# Patient Record
Sex: FEMALE | Race: WHITE | Employment: PART TIME | URBAN - METROPOLITAN AREA
[De-identification: names, ages, dates, MRNs, and addresses within clinical notes are randomized per-mention and may not be internally consistent; named-entity substitution may affect disease eponyms.]

---

## 2020-06-15 ENCOUNTER — APPOINTMENT (OUTPATIENT)
Dept: CT IMAGING | Age: 25
End: 2020-06-15
Payer: COMMERCIAL

## 2020-06-15 ENCOUNTER — HOSPITAL ENCOUNTER (EMERGENCY)
Age: 25
Discharge: HOME | End: 2020-06-15
Attending: EMERGENCY MEDICINE
Payer: COMMERCIAL

## 2020-06-15 VITALS
DIASTOLIC BLOOD PRESSURE: 102 MMHG | OXYGEN SATURATION: 97 % | SYSTOLIC BLOOD PRESSURE: 138 MMHG | TEMPERATURE: 97.9 F | WEIGHT: 230 LBS | HEART RATE: 91 BPM | RESPIRATION RATE: 13 BRPM | HEIGHT: 67 IN | BODY MASS INDEX: 36.1 KG/M2

## 2020-06-15 LAB
ALBUMIN SERPL-MCNC: 4.4 GM/DL (ref 3.4–5)
ALP BLD-CCNC: 75 IU/L (ref 40–129)
ALT SERPL-CCNC: 16 U/L (ref 10–40)
AMPHETAMINES: NEGATIVE
ANION GAP SERPL CALCULATED.3IONS-SCNC: 12 MMOL/L (ref 4–16)
AST SERPL-CCNC: 18 IU/L (ref 15–37)
BACTERIA: ABNORMAL /HPF
BARBITURATE SCREEN URINE: NEGATIVE
BASOPHILS ABSOLUTE: 0 K/CU MM
BASOPHILS RELATIVE PERCENT: 0.3 % (ref 0–1)
BENZODIAZEPINE SCREEN, URINE: ABNORMAL
BILIRUB SERPL-MCNC: 0.5 MG/DL (ref 0–1)
BILIRUBIN URINE: NEGATIVE MG/DL
BLOOD, URINE: NEGATIVE
BUN BLDV-MCNC: 8 MG/DL (ref 6–23)
CALCIUM SERPL-MCNC: 9.6 MG/DL (ref 8.3–10.6)
CANNABINOID SCREEN URINE: ABNORMAL
CAST TYPE: ABNORMAL /HPF
CHLORIDE BLD-SCNC: 104 MMOL/L (ref 99–110)
CLARITY: ABNORMAL
CO2: 21 MMOL/L (ref 21–32)
COCAINE METABOLITE: NEGATIVE
COLOR: ABNORMAL
COMMENT UA: NEGATIVE
CREAT SERPL-MCNC: 0.7 MG/DL (ref 0.6–1.1)
CRYSTAL TYPE: ABNORMAL /HPF
DIFFERENTIAL TYPE: ABNORMAL
EOSINOPHILS ABSOLUTE: 0.2 K/CU MM
EOSINOPHILS RELATIVE PERCENT: 2.3 % (ref 0–3)
EPITHELIAL CELLS, UA: 20 /HPF
GFR AFRICAN AMERICAN: >60 ML/MIN/1.73M2
GFR NON-AFRICAN AMERICAN: >60 ML/MIN/1.73M2
GLUCOSE BLD-MCNC: 113 MG/DL (ref 70–99)
GLUCOSE, URINE: NEGATIVE MG/DL
HCG QUALITATIVE: NEGATIVE
HCT VFR BLD CALC: 43.9 % (ref 37–47)
HEMOGLOBIN: 15 GM/DL (ref 12.5–16)
IMMATURE NEUTROPHIL %: 0.2 % (ref 0–0.43)
KETONES, URINE: NEGATIVE MG/DL
LEUKOCYTE ESTERASE, URINE: NEGATIVE
LIPASE: 21 IU/L (ref 13–60)
LYMPHOCYTES ABSOLUTE: 1.3 K/CU MM
LYMPHOCYTES RELATIVE PERCENT: 20.1 % (ref 24–44)
MCH RBC QN AUTO: 30.1 PG (ref 27–31)
MCHC RBC AUTO-ENTMCNC: 34.2 % (ref 32–36)
MCV RBC AUTO: 88.2 FL (ref 78–100)
MONOCYTES ABSOLUTE: 0.5 K/CU MM
MONOCYTES RELATIVE PERCENT: 6.9 % (ref 0–4)
NITRITE URINE, QUANTITATIVE: NEGATIVE
OPIATES, URINE: NEGATIVE
OXYCODONE: NEGATIVE
PDW BLD-RTO: 11.9 % (ref 11.7–14.9)
PH, URINE: 7.5 (ref 5–8)
PHENCYCLIDINE, URINE: NEGATIVE
PLATELET # BLD: 313 K/CU MM (ref 140–440)
PMV BLD AUTO: 10.2 FL (ref 7.5–11.1)
POTASSIUM SERPL-SCNC: 4 MMOL/L (ref 3.5–5.1)
PROTEIN UA: NEGATIVE MG/DL
RBC # BLD: 4.98 M/CU MM (ref 4.2–5.4)
RBC URINE: 1 /HPF (ref 0–6)
SEGMENTED NEUTROPHILS ABSOLUTE COUNT: 4.6 K/CU MM
SEGMENTED NEUTROPHILS RELATIVE PERCENT: 70.2 % (ref 36–66)
SODIUM BLD-SCNC: 137 MMOL/L (ref 135–145)
SPECIFIC GRAVITY UA: 1.01 (ref 1–1.03)
TOTAL IMMATURE NEUTOROPHIL: 0.01 K/CU MM
TOTAL PROTEIN: 7.6 GM/DL (ref 6.4–8.2)
UROBILINOGEN, URINE: 0.2 MG/DL (ref 0.2–1)
WBC # BLD: 6.6 K/CU MM (ref 4–10.5)
WBC UA: 3 /HPF (ref 0–5)

## 2020-06-15 PROCEDURE — 96376 TX/PRO/DX INJ SAME DRUG ADON: CPT

## 2020-06-15 PROCEDURE — 84703 CHORIONIC GONADOTROPIN ASSAY: CPT

## 2020-06-15 PROCEDURE — 6360000002 HC RX W HCPCS: Performed by: PHYSICIAN ASSISTANT

## 2020-06-15 PROCEDURE — 6360000004 HC RX CONTRAST MEDICATION: Performed by: EMERGENCY MEDICINE

## 2020-06-15 PROCEDURE — 96365 THER/PROPH/DIAG IV INF INIT: CPT

## 2020-06-15 PROCEDURE — 96375 TX/PRO/DX INJ NEW DRUG ADDON: CPT

## 2020-06-15 PROCEDURE — 6360000002 HC RX W HCPCS: Performed by: EMERGENCY MEDICINE

## 2020-06-15 PROCEDURE — 96372 THER/PROPH/DIAG INJ SC/IM: CPT

## 2020-06-15 PROCEDURE — 83690 ASSAY OF LIPASE: CPT

## 2020-06-15 PROCEDURE — 2580000003 HC RX 258: Performed by: EMERGENCY MEDICINE

## 2020-06-15 PROCEDURE — 80053 COMPREHEN METABOLIC PANEL: CPT

## 2020-06-15 PROCEDURE — 81001 URINALYSIS AUTO W/SCOPE: CPT

## 2020-06-15 PROCEDURE — 74177 CT ABD & PELVIS W/CONTRAST: CPT

## 2020-06-15 PROCEDURE — 85025 COMPLETE CBC W/AUTO DIFF WBC: CPT

## 2020-06-15 PROCEDURE — 6370000000 HC RX 637 (ALT 250 FOR IP): Performed by: EMERGENCY MEDICINE

## 2020-06-15 PROCEDURE — 71275 CT ANGIOGRAPHY CHEST: CPT

## 2020-06-15 PROCEDURE — 80307 DRUG TEST PRSMV CHEM ANLYZR: CPT

## 2020-06-15 PROCEDURE — 99284 EMERGENCY DEPT VISIT MOD MDM: CPT

## 2020-06-15 RX ORDER — DICYCLOMINE HYDROCHLORIDE 10 MG/ML
20 INJECTION INTRAMUSCULAR ONCE
Status: COMPLETED | OUTPATIENT
Start: 2020-06-15 | End: 2020-06-15

## 2020-06-15 RX ORDER — KETOROLAC TROMETHAMINE 30 MG/ML
15 INJECTION, SOLUTION INTRAMUSCULAR; INTRAVENOUS ONCE
Status: COMPLETED | OUTPATIENT
Start: 2020-06-15 | End: 2020-06-15

## 2020-06-15 RX ORDER — ONDANSETRON 4 MG/1
4 TABLET, ORALLY DISINTEGRATING ORAL ONCE
Status: DISCONTINUED | OUTPATIENT
Start: 2020-06-15 | End: 2020-06-15

## 2020-06-15 RX ORDER — DIPHENHYDRAMINE HYDROCHLORIDE 50 MG/ML
25 INJECTION INTRAMUSCULAR; INTRAVENOUS ONCE
Status: COMPLETED | OUTPATIENT
Start: 2020-06-15 | End: 2020-06-15

## 2020-06-15 RX ORDER — HYDROCODONE BITARTRATE AND ACETAMINOPHEN 5; 325 MG/1; MG/1
1 TABLET ORAL EVERY 6 HOURS PRN
Qty: 12 TABLET | Refills: 0 | Status: SHIPPED | OUTPATIENT
Start: 2020-06-15 | End: 2020-06-18

## 2020-06-15 RX ORDER — MORPHINE SULFATE 4 MG/ML
4 INJECTION, SOLUTION INTRAMUSCULAR; INTRAVENOUS ONCE
Status: COMPLETED | OUTPATIENT
Start: 2020-06-15 | End: 2020-06-15

## 2020-06-15 RX ORDER — NAPROXEN 500 MG/1
500 TABLET ORAL 2 TIMES DAILY WITH MEALS
Qty: 180 TABLET | Refills: 1 | Status: SHIPPED | OUTPATIENT
Start: 2020-06-15

## 2020-06-15 RX ORDER — ONDANSETRON 2 MG/ML
4 INJECTION INTRAMUSCULAR; INTRAVENOUS
Status: DISCONTINUED | OUTPATIENT
Start: 2020-06-15 | End: 2020-06-15 | Stop reason: HOSPADM

## 2020-06-15 RX ORDER — DIPHENHYDRAMINE HYDROCHLORIDE 50 MG/ML
50 INJECTION INTRAMUSCULAR; INTRAVENOUS ONCE
Status: COMPLETED | OUTPATIENT
Start: 2020-06-15 | End: 2020-06-15

## 2020-06-15 RX ORDER — LORAZEPAM 1 MG/1
1 TABLET ORAL EVERY 6 HOURS PRN
Qty: 10 TABLET | Refills: 0 | Status: SHIPPED | OUTPATIENT
Start: 2020-06-15 | End: 2020-07-15

## 2020-06-15 RX ORDER — ONDANSETRON 2 MG/ML
4 INJECTION INTRAMUSCULAR; INTRAVENOUS EVERY 30 MIN PRN
Status: DISCONTINUED | OUTPATIENT
Start: 2020-06-15 | End: 2020-06-15 | Stop reason: HOSPADM

## 2020-06-15 RX ORDER — MORPHINE SULFATE 4 MG/ML
4 INJECTION, SOLUTION INTRAMUSCULAR; INTRAVENOUS EVERY 4 HOURS PRN
Status: DISCONTINUED | OUTPATIENT
Start: 2020-06-15 | End: 2020-06-15

## 2020-06-15 RX ORDER — METHYLPREDNISOLONE SODIUM SUCCINATE 125 MG/2ML
125 INJECTION, POWDER, LYOPHILIZED, FOR SOLUTION INTRAMUSCULAR; INTRAVENOUS ONCE
Status: COMPLETED | OUTPATIENT
Start: 2020-06-15 | End: 2020-06-15

## 2020-06-15 RX ORDER — SODIUM CHLORIDE, SODIUM LACTATE, POTASSIUM CHLORIDE, CALCIUM CHLORIDE 600; 310; 30; 20 MG/100ML; MG/100ML; MG/100ML; MG/100ML
1000 INJECTION, SOLUTION INTRAVENOUS ONCE
Status: COMPLETED | OUTPATIENT
Start: 2020-06-15 | End: 2020-06-15

## 2020-06-15 RX ORDER — ACETAMINOPHEN 325 MG/1
650 TABLET ORAL EVERY 6 HOURS PRN
Qty: 20 TABLET | Refills: 0 | Status: SHIPPED | OUTPATIENT
Start: 2020-06-15

## 2020-06-15 RX ORDER — HYDROCODONE BITARTRATE AND ACETAMINOPHEN 5; 325 MG/1; MG/1
1 TABLET ORAL EVERY 6 HOURS PRN
Status: DISCONTINUED | OUTPATIENT
Start: 2020-06-15 | End: 2020-06-15 | Stop reason: HOSPADM

## 2020-06-15 RX ADMIN — ONDANSETRON 4 MG: 2 INJECTION INTRAMUSCULAR; INTRAVENOUS at 18:32

## 2020-06-15 RX ADMIN — METHYLPREDNISOLONE SODIUM SUCCINATE 125 MG: 125 INJECTION, POWDER, FOR SOLUTION INTRAMUSCULAR; INTRAVENOUS at 18:32

## 2020-06-15 RX ADMIN — IOPAMIDOL 75 ML: 755 INJECTION, SOLUTION INTRAVENOUS at 19:15

## 2020-06-15 RX ADMIN — DICYCLOMINE HYDROCHLORIDE 20 MG: 20 INJECTION, SOLUTION INTRAMUSCULAR at 18:32

## 2020-06-15 RX ADMIN — HYDROCODONE BITARTRATE AND ACETAMINOPHEN 1 TABLET: 5; 325 TABLET ORAL at 21:21

## 2020-06-15 RX ADMIN — ONDANSETRON 4 MG: 2 INJECTION INTRAMUSCULAR; INTRAVENOUS at 19:26

## 2020-06-15 RX ADMIN — DIPHENHYDRAMINE HYDROCHLORIDE 25 MG: 50 INJECTION INTRAMUSCULAR; INTRAVENOUS at 20:13

## 2020-06-15 RX ADMIN — KETOROLAC TROMETHAMINE 15 MG: 30 INJECTION, SOLUTION INTRAMUSCULAR; INTRAVENOUS at 18:32

## 2020-06-15 RX ADMIN — MORPHINE SULFATE 4 MG: 4 INJECTION INTRAVENOUS at 19:25

## 2020-06-15 RX ADMIN — DIPHENHYDRAMINE HYDROCHLORIDE 50 MG: 50 INJECTION INTRAMUSCULAR; INTRAVENOUS at 18:32

## 2020-06-15 RX ADMIN — IOPAMIDOL 75 ML: 755 INJECTION, SOLUTION INTRAVENOUS at 20:44

## 2020-06-15 RX ADMIN — SODIUM CHLORIDE, POTASSIUM CHLORIDE, SODIUM LACTATE AND CALCIUM CHLORIDE 1000 ML: 600; 310; 30; 20 INJECTION, SOLUTION INTRAVENOUS at 20:13

## 2020-06-15 RX ADMIN — ONDANSETRON 4 MG: 2 INJECTION INTRAMUSCULAR; INTRAVENOUS at 20:13

## 2020-06-15 RX ADMIN — MORPHINE SULFATE 4 MG: 4 INJECTION INTRAVENOUS at 20:13

## 2020-06-15 ASSESSMENT — PAIN SCALES - GENERAL
PAINLEVEL_OUTOF10: 7

## 2020-06-15 ASSESSMENT — ENCOUNTER SYMPTOMS
RESPIRATORY NEGATIVE: 1
NAUSEA: 0
CONSTIPATION: 0
DIARRHEA: 0
SHORTNESS OF BREATH: 0
CHEST TIGHTNESS: 0
COUGH: 0
EYES NEGATIVE: 1
PHOTOPHOBIA: 0
ABDOMINAL PAIN: 1
APNEA: 0
SINUS PAIN: 0
VOICE CHANGE: 0
EYE ITCHING: 0
SINUS PRESSURE: 0
TROUBLE SWALLOWING: 0
STRIDOR: 0
BLOOD IN STOOL: 0
BACK PAIN: 0
FACIAL SWELLING: 0
RHINORRHEA: 0
RECTAL PAIN: 0
WHEEZING: 0
EYE REDNESS: 0
CHOKING: 0
EYE DISCHARGE: 0
VOMITING: 0
EYE PAIN: 0

## 2020-06-15 ASSESSMENT — PAIN DESCRIPTION - ORIENTATION: ORIENTATION: LOWER

## 2020-06-15 ASSESSMENT — PAIN DESCRIPTION - DESCRIPTORS: DESCRIPTORS: SHARP;CRAMPING

## 2020-06-15 ASSESSMENT — PAIN DESCRIPTION - LOCATION: LOCATION: ABDOMEN

## 2020-06-15 ASSESSMENT — PAIN DESCRIPTION - PAIN TYPE: TYPE: ACUTE PAIN

## 2020-06-15 NOTE — ED PROVIDER NOTES
[facility])     2010    Ovarian cyst      Past Surgical History:   Procedure Laterality Date    ANKLE SURGERY      rt     SECTION      CHOLECYSTECTOMY      SHOULDER SURGERY      left       CURRENT MEDICATIONS    Current Outpatient Rx   Medication Sig Dispense Refill    Cyclobenzaprine HCl (FLEXERIL PO) Take 0.5 mg by mouth         ALLERGIES    Allergies   Allergen Reactions    Effexor [Venlafaxine] Nausea And Vomiting    Iv Contrast [Iodides] Hives    Nsaids Nausea And Vomiting       SOCIAL AND FAMILY HISTORY    Social History     Socioeconomic History    Marital status: Single     Spouse name: None    Number of children: None    Years of education: None    Highest education level: None   Occupational History    None   Social Needs    Financial resource strain: None    Food insecurity     Worry: None     Inability: None    Transportation needs     Medical: None     Non-medical: None   Tobacco Use    Smoking status: Current Every Day Smoker     Packs/day: 0.50     Types: Cigarettes   Substance and Sexual Activity    Alcohol use: Yes     Comment: occ    Drug use: Not Currently    Sexual activity: None   Lifestyle    Physical activity     Days per week: None     Minutes per session: None    Stress: None   Relationships    Social connections     Talks on phone: None     Gets together: None     Attends Sikhism service: None     Active member of club or organization: None     Attends meetings of clubs or organizations: None     Relationship status: None    Intimate partner violence     Fear of current or ex partner: None     Emotionally abused: None     Physically abused: None     Forced sexual activity: None   Other Topics Concern    None   Social History Narrative    None     History reviewed. No pertinent family history.       PHYSICAL EXAM    VITAL SIGNS: BP (!) 138/102   Pulse 113   Temp 97.9 °F (36.6 °C) (Tympanic)   Resp 13   Ht 5' 7\" (1.702 m)   Wt 230 lb (104.3 kg) SpO2 97%   BMI 36.02 kg/m²    Constitutional:  Well developed, Well nourished, NAD  HENT:  Normocephalic, Atraumatic, PERRL. EOMI. Sclera clear. Conjunctiva normal, No discharge. Neck/Lymphatics: supple, no JVD, no swollen nodes  Cardiovascular:  Rate 113, regular rhythm,  no murmurs/rubs/gallops. No carotid bruits or murmurs heard in carotids. No JVD  Respiratory:  Nonlabored breathing. Normal breath sounds, No wheezing  Abdomen:   Right lower quadrant tenderness, McBurney point tenderness and mild tenderness in the left lower quadrant as well ,Bowel sounds normal, Soft,  no masses. Musculoskeletal:    There is no edema, asymmetry, or calf / thigh tenderness bilaterally. No cyanosis. No cool or pale-appearing limb. Distal cap refill and pulses intact bilateral upper and lower extremities  Bilateral upper and lower extremity ROM intact without pain or obvious deficit  Integument:  Warm, Dry  Neurologic: Alert & oriented , No focal deficits noted. Cranial nerves II through XII grossly intact. Normal gross motor coordination & motor strength bilateral upper and lower extremities  Sensation intact.   Psychiatric:  Affect normal, Mood normal.       Results for orders placed or performed during the hospital encounter of 06/15/20   Urinalysis   Result Value Ref Range    Color, UA DARK YELLOW (A) YELLOW    Clarity, UA SLIGHTLY CLOUDY (A) CLEAR    Glucose, Urine NEGATIVE NEGATIVE MG/DL    Bilirubin Urine NEGATIVE NEGATIVE MG/DL    Ketones, Urine NEGATIVE NEGATIVE MG/DL    Specific Gravity, UA 1.015 1.001 - 1.035    Blood, Urine NEGATIVE NEGATIVE    pH, Urine 7.5 5.0 - 8.0    Protein, UA NEGATIVE NEGATIVE MG/DL    Urobilinogen, Urine 0.2 0.2 - 1.0 MG/DL    Nitrite Urine, Quantitative NEGATIVE NEGATIVE    Leukocyte Esterase, Urine NEGATIVE NEGATIVE    RBC, UA 1 0 - 6 /HPF    WBC, UA 3 0 - 5 /HPF    Epithelial Cells, UA 20 /HPF    Cast Type NO CAST FORMS SEEN NO CAST FORMS SEEN /HPF    Bacteria, UA MODERATE

## 2020-06-16 ENCOUNTER — APPOINTMENT (OUTPATIENT)
Dept: CT IMAGING | Age: 25
End: 2020-06-16
Payer: COMMERCIAL

## 2020-06-16 ENCOUNTER — APPOINTMENT (OUTPATIENT)
Dept: ULTRASOUND IMAGING | Age: 25
End: 2020-06-16
Payer: COMMERCIAL

## 2020-06-16 ENCOUNTER — HOSPITAL ENCOUNTER (EMERGENCY)
Age: 25
Discharge: HOME | End: 2020-06-16
Attending: EMERGENCY MEDICINE
Payer: COMMERCIAL

## 2020-06-16 VITALS
OXYGEN SATURATION: 97 % | TEMPERATURE: 97.5 F | HEART RATE: 88 BPM | SYSTOLIC BLOOD PRESSURE: 126 MMHG | DIASTOLIC BLOOD PRESSURE: 88 MMHG | HEIGHT: 67 IN | WEIGHT: 214 LBS | RESPIRATION RATE: 14 BRPM | BODY MASS INDEX: 33.59 KG/M2

## 2020-06-16 PROBLEM — I26.99 PE (PULMONARY THROMBOEMBOLISM) (HCC): Status: ACTIVE | Noted: 2020-06-16

## 2020-06-16 PROBLEM — Z76.5 DRUG-SEEKING BEHAVIOR: Status: ACTIVE | Noted: 2020-06-16

## 2020-06-16 LAB
APTT: 38.8 SECONDS (ref 25.1–37.1)
D DIMER: 1.23 UG/ML (FEU)
FIBRINOGEN LEVEL: 418 MG/DL (ref 196.9–442.1)
HOMOCYSTEINE: 10.6 UMOL/L (ref 0–10)
INR BLD: 1.42 INDEX
PROTHROMBIN TIME: 17.2 SECONDS (ref 11.7–14.5)

## 2020-06-16 PROCEDURE — 71275 CT ANGIOGRAPHY CHEST: CPT

## 2020-06-16 PROCEDURE — 85613 RUSSELL VIPER VENOM DILUTED: CPT

## 2020-06-16 PROCEDURE — 96375 TX/PRO/DX INJ NEW DRUG ADDON: CPT

## 2020-06-16 PROCEDURE — 81241 F5 GENE: CPT

## 2020-06-16 PROCEDURE — 85384 FIBRINOGEN ACTIVITY: CPT

## 2020-06-16 PROCEDURE — 93970 EXTREMITY STUDY: CPT

## 2020-06-16 PROCEDURE — 96374 THER/PROPH/DIAG INJ IV PUSH: CPT

## 2020-06-16 PROCEDURE — 85240 CLOT FACTOR VIII AHG 1 STAGE: CPT

## 2020-06-16 PROCEDURE — 81291 MTHFR GENE: CPT

## 2020-06-16 PROCEDURE — 83090 ASSAY OF HOMOCYSTEINE: CPT

## 2020-06-16 PROCEDURE — 99283 EMERGENCY DEPT VISIT LOW MDM: CPT

## 2020-06-16 PROCEDURE — 2580000003 HC RX 258: Performed by: EMERGENCY MEDICINE

## 2020-06-16 PROCEDURE — 6360000002 HC RX W HCPCS: Performed by: EMERGENCY MEDICINE

## 2020-06-16 PROCEDURE — 85730 THROMBOPLASTIN TIME PARTIAL: CPT

## 2020-06-16 PROCEDURE — 96376 TX/PRO/DX INJ SAME DRUG ADON: CPT

## 2020-06-16 PROCEDURE — 6370000000 HC RX 637 (ALT 250 FOR IP): Performed by: EMERGENCY MEDICINE

## 2020-06-16 PROCEDURE — 81240 F2 GENE: CPT

## 2020-06-16 PROCEDURE — 6360000004 HC RX CONTRAST MEDICATION: Performed by: EMERGENCY MEDICINE

## 2020-06-16 PROCEDURE — 85379 FIBRIN DEGRADATION QUANT: CPT

## 2020-06-16 PROCEDURE — 85610 PROTHROMBIN TIME: CPT

## 2020-06-16 RX ORDER — SODIUM CHLORIDE 0.9 % (FLUSH) 0.9 %
10 SYRINGE (ML) INJECTION
Status: COMPLETED | OUTPATIENT
Start: 2020-06-16 | End: 2020-06-16

## 2020-06-16 RX ORDER — IBUPROFEN 400 MG/1
800 TABLET ORAL ONCE
Status: DISCONTINUED | OUTPATIENT
Start: 2020-06-16 | End: 2020-06-16 | Stop reason: HOSPADM

## 2020-06-16 RX ORDER — FENTANYL CITRATE 50 UG/ML
50 INJECTION, SOLUTION INTRAMUSCULAR; INTRAVENOUS
Status: DISCONTINUED | OUTPATIENT
Start: 2020-06-16 | End: 2020-06-16 | Stop reason: HOSPADM

## 2020-06-16 RX ORDER — HYDROCODONE BITARTRATE AND ACETAMINOPHEN 5; 325 MG/1; MG/1
1 TABLET ORAL EVERY 6 HOURS PRN
Status: DISCONTINUED | OUTPATIENT
Start: 2020-06-16 | End: 2020-06-16 | Stop reason: HOSPADM

## 2020-06-16 RX ORDER — DIPHENHYDRAMINE HYDROCHLORIDE 50 MG/ML
50 INJECTION INTRAMUSCULAR; INTRAVENOUS ONCE
Status: COMPLETED | OUTPATIENT
Start: 2020-06-16 | End: 2020-06-16

## 2020-06-16 RX ORDER — ONDANSETRON 2 MG/ML
4 INJECTION INTRAMUSCULAR; INTRAVENOUS
Status: DISCONTINUED | OUTPATIENT
Start: 2020-06-16 | End: 2020-06-16 | Stop reason: HOSPADM

## 2020-06-16 RX ORDER — METHYLPREDNISOLONE SODIUM SUCCINATE 125 MG/2ML
125 INJECTION, POWDER, LYOPHILIZED, FOR SOLUTION INTRAMUSCULAR; INTRAVENOUS ONCE
Status: COMPLETED | OUTPATIENT
Start: 2020-06-16 | End: 2020-06-16

## 2020-06-16 RX ADMIN — IOPAMIDOL 90 ML: 755 INJECTION, SOLUTION INTRAVENOUS at 18:57

## 2020-06-16 RX ADMIN — ONDANSETRON 4 MG: 2 INJECTION INTRAMUSCULAR; INTRAVENOUS at 17:53

## 2020-06-16 RX ADMIN — FENTANYL CITRATE 50 MCG: 50 INJECTION INTRAMUSCULAR; INTRAVENOUS at 16:22

## 2020-06-16 RX ADMIN — HYDROCODONE BITARTRATE AND ACETAMINOPHEN 1 TABLET: 5; 325 TABLET ORAL at 19:59

## 2020-06-16 RX ADMIN — FENTANYL CITRATE 50 MCG: 50 INJECTION INTRAMUSCULAR; INTRAVENOUS at 17:53

## 2020-06-16 RX ADMIN — DIPHENHYDRAMINE HYDROCHLORIDE 50 MG: 50 INJECTION INTRAMUSCULAR; INTRAVENOUS at 18:29

## 2020-06-16 RX ADMIN — SODIUM CHLORIDE, PRESERVATIVE FREE 10 ML: 5 INJECTION INTRAVENOUS at 18:58

## 2020-06-16 RX ADMIN — METHYLPREDNISOLONE SODIUM SUCCINATE 125 MG: 125 INJECTION, POWDER, FOR SOLUTION INTRAMUSCULAR; INTRAVENOUS at 18:29

## 2020-06-16 ASSESSMENT — PAIN SCALES - GENERAL
PAINLEVEL_OUTOF10: 7
PAINLEVEL_OUTOF10: 7
PAINLEVEL_OUTOF10: 8
PAINLEVEL_OUTOF10: 8

## 2020-06-16 ASSESSMENT — PAIN DESCRIPTION - PAIN TYPE: TYPE: ACUTE PAIN

## 2020-06-16 ASSESSMENT — PAIN DESCRIPTION - LOCATION: LOCATION: ABDOMEN

## 2020-06-16 NOTE — ED NOTES
Pt called out states her pain is returning and nauseated  Dr Suzan Josue notified no new orders at this time      Pricilla Mckenzie RN  06/16/20 9460

## 2020-06-16 NOTE — ED PROVIDER NOTES
[iodides]; and Nsaids    FAMILY HISTORY     History reviewed. No pertinent family history. SOCIAL HISTORY       Social History     Socioeconomic History    Marital status: Single     Spouse name: None    Number of children: None    Years of education: None    Highest education level: None   Occupational History    None   Social Needs    Financial resource strain: None    Food insecurity     Worry: None     Inability: None    Transportation needs     Medical: None     Non-medical: None   Tobacco Use    Smoking status: Current Every Day Smoker     Packs/day: 0.50     Types: Cigarettes   Substance and Sexual Activity    Alcohol use: Yes     Comment: occ    Drug use: Not Currently    Sexual activity: None   Lifestyle    Physical activity     Days per week: None     Minutes per session: None    Stress: None   Relationships    Social connections     Talks on phone: None     Gets together: None     Attends Holiness service: None     Active member of club or organization: None     Attends meetings of clubs or organizations: None     Relationship status: None    Intimate partner violence     Fear of current or ex partner: None     Emotionally abused: None     Physically abused: None     Forced sexual activity: None   Other Topics Concern    None   Social History Narrative    None       SCREENINGS               PHYSICAL EXAM    (up to 7 for level 4, 8 or more for level 5)     ED Triage Vitals [06/15/20 1756]   BP Temp Temp Source Pulse Resp SpO2 Height Weight   (!) 138/102 97.9 °F (36.6 °C) Tympanic 113 13 97 % 5' 7\" (1.702 m) 230 lb (104.3 kg)       Physical Exam  Vitals signs and nursing note reviewed. Constitutional:       General: She is not in acute distress. Appearance: She is well-developed. She is not diaphoretic. HENT:      Head: Normocephalic and atraumatic.       Right Ear: External ear normal.      Left Ear: External ear normal.      Nose: Nose normal.      Mouth/Throat: Abnormal; Notable for the following components:    Glucose 113 (*)     All other components within normal limits   URINALYSIS - Abnormal; Notable for the following components:    Color, UA DARK YELLOW (*)     Clarity, UA SLIGHTLY CLOUDY (*)     Bacteria, UA MODERATE NUMBER OR AMOUNT OBSERVED (*)     Crystal Type NO CELLS SEEN (*)     All other components within normal limits   URINE DRUG SCREEN - Abnormal; Notable for the following components:    Cannabinoid Scrn, Ur UNCONFIRMED POSITIVE (*)     Benzodiazepine Screen, Urine UNCONFIRMED POSITIVE (*)     All other components within normal limits   LIPASE   HCG, SERUM, QUALITATIVE          EKG: All EKG's are interpreted by the Emergency Department Physician who either signs or Co-signs this chart in the absence of a cardiologist.       EKG Interpretation    Interpreted by emergency department physician    Rhythm: normal sinus   Rate: normal  Axis: normal  Ectopy: none  Conduction: normal  ST Segments: no acute change  T Waves: no acute change  Q Waves: none    Clinical Impression: no acute changes    Keisha Bowie     RADIOLOGY:     Non-plain film images such as CT, Ultrasound and MRI are read by the radiologist. Plain radiographic images are visualized and preliminarily interpreted by the emergency physician. Interpretation per the Radiologist below, if available at the time of this note:    CTA PULMONARY W CONTRAST   Final Result   Addendum 1 of 1   ADDENDUM:   Findings were discussed with Tl Kent at 9:08 pm on 6/15/2020. Final      CT ABDOMEN PELVIS W IV CONTRAST Additional Contrast? None   Final Result   Addendum 1 of 1   ADDENDUM:   Findings were discussed with Dr. Ivory Lazar at 7:59 pm on 6/15/2020.          Final            ED BEDSIDE ULTRASOUND:   Performed by ED Physician Keisha Bowie DO       LABS:  Labs Reviewed   CBC WITH AUTO DIFFERENTIAL - Abnormal; Notable for the following components:       Result Value    Segs Relative 70.2 (*) PM      PATIENT REFERRED TO:  PCM in 3 days          Veronica Calzada MD  [address]  [address] [address]  [phone]    In 2 days        DISCHARGE MEDICATIONS:  New Prescriptions    No medications on file       ED Provider Disposition Time  DISPOSITION Decision To Discharge 06/15/2020 09:20:54 PM      Appropriate personal protective equipment was worn during the patient's evaluation. These included surgical, eye protection, surgical mask or in 95 respirator and gloves. The patient was also placed in a surgical mask for the prevention of possible spread of respiratory viral illnesses. The Patient was instructed to read the package inserts with any medication that was prescribed. Major potential reactions and medication interactions were discussed. The Patient understands that there are numerous possible adverse reactions not covered. The patient was also instructed to arrange follow-up with his or her primary care provider for review of any pending labwork or incidental findings on any radiology results that were obtained. All efforts were made to discuss any incidental findings that require further monitoring. Controlled Substances Monitoring:     No flowsheet data found.     (Please note that portions of this note were completed with a voice recognition program.  Efforts were made to edit the dictations but occasionally words are mis-transcribed.)    Liborio Hendrickson DO (electronically signed)  Attending Emergency Physician            Liborio Hendrickson DO  06/15/20 3877

## 2020-06-19 LAB
DILUTE RUSSELL VIPER VENOM TIME: 57 SEC (ref 33–44)
DRVVT 1 TO 1 MIX REFLEX ONLY: 51 SEC (ref 33–44)
DRVVT CONFIRMATION TEST: NEGATIVE RATIO
FACTOR V LEIDEN: NEGATIVE
FACTOR VIII ACTIVITY: 115 % (ref 56–191)

## 2020-06-23 LAB — PROTHROMBIN G20210A MUTATION: NORMAL

## 2020-06-24 LAB
MTHFR BY PCR SPECIMEN: NORMAL
MTHFR INTERPRETATION: NORMAL
MTHFR MUTATION A1298C: NORMAL
MTHFR MUTATION C677T: NEGATIVE

## 2023-03-06 ENCOUNTER — TELEPHONE (OUTPATIENT)
Dept: PRIMARY CARE | Facility: CLINIC | Age: 28
End: 2023-03-06
Payer: COMMERCIAL

## 2023-03-07 PROBLEM — M47.812 CERVICAL SPONDYLOSIS WITHOUT MYELOPATHY: Status: ACTIVE | Noted: 2023-03-07

## 2023-03-07 PROBLEM — N64.4 BREAST PAIN IN FEMALE: Status: ACTIVE | Noted: 2023-03-07

## 2023-03-07 PROBLEM — R10.2 PELVIC PAIN IN FEMALE: Status: ACTIVE | Noted: 2023-03-07

## 2023-03-07 PROBLEM — S82.51XA FRACTURE OF MEDIAL MALLEOLUS, RIGHT, CLOSED: Status: ACTIVE | Noted: 2023-03-07

## 2023-03-07 PROBLEM — L72.9 SUBCUTANEOUS CYST: Status: ACTIVE | Noted: 2023-03-07

## 2023-03-07 PROBLEM — G47.11 IDIOPATHIC HYPERSOMNIA: Status: ACTIVE | Noted: 2023-03-07

## 2023-03-07 PROBLEM — N92.6 IRREGULAR MENSES: Status: ACTIVE | Noted: 2023-03-07

## 2023-03-07 PROBLEM — L02.91 ABSCESS: Status: ACTIVE | Noted: 2023-03-07

## 2023-03-07 PROBLEM — N64.4 PAIN OF RIGHT BREAST: Status: ACTIVE | Noted: 2023-03-07

## 2023-03-07 PROBLEM — U07.1 COVID-19: Status: ACTIVE | Noted: 2023-03-07

## 2023-03-07 PROBLEM — I80.8 SUPERFICIAL THROMBOPHLEBITIS OF ARM: Status: ACTIVE | Noted: 2023-03-07

## 2023-03-07 PROBLEM — R11.2 NAUSEA AND/OR VOMITING: Status: ACTIVE | Noted: 2023-03-07

## 2023-03-07 PROBLEM — G47.00 INSOMNIA: Status: ACTIVE | Noted: 2023-03-07

## 2023-03-07 PROBLEM — M54.2 NECK PAIN: Status: ACTIVE | Noted: 2023-03-07

## 2023-03-07 PROBLEM — R45.86 MOOD CHANGE: Status: ACTIVE | Noted: 2023-03-07

## 2023-03-07 PROBLEM — I63.9 CVA (CEREBRAL VASCULAR ACCIDENT) (MULTI): Status: ACTIVE | Noted: 2023-03-07

## 2023-03-07 PROBLEM — G89.29 CHRONIC PAIN: Status: ACTIVE | Noted: 2023-03-07

## 2023-03-07 PROBLEM — I63.9 STROKE (MULTI): Status: ACTIVE | Noted: 2023-03-07

## 2023-03-07 PROBLEM — M54.50 LOW BACK PAIN: Status: ACTIVE | Noted: 2023-03-07

## 2023-03-07 PROBLEM — M54.2 CERVICAL PAIN: Status: ACTIVE | Noted: 2023-03-07

## 2023-03-07 PROBLEM — B96.89 BV (BACTERIAL VAGINOSIS): Status: ACTIVE | Noted: 2023-03-07

## 2023-03-07 PROBLEM — M25.312 INSTABILITY OF BOTH SHOULDER JOINTS: Status: ACTIVE | Noted: 2023-03-07

## 2023-03-07 PROBLEM — M25.511 BILATERAL SHOULDER PAIN: Status: ACTIVE | Noted: 2023-03-07

## 2023-03-07 PROBLEM — S82.891A OTHER FRACTURE OF RIGHT LOWER LEG, INITIAL ENCOUNTER FOR CLOSED FRACTURE: Status: ACTIVE | Noted: 2023-03-07

## 2023-03-07 PROBLEM — Q44.5 CYSTIC DILATATION OF COMMON BILE DUCT: Status: ACTIVE | Noted: 2023-03-07

## 2023-03-07 PROBLEM — M25.311 INSTABILITY OF BOTH SHOULDER JOINTS: Status: ACTIVE | Noted: 2023-03-07

## 2023-03-07 PROBLEM — R51.9 HEADACHE: Status: ACTIVE | Noted: 2023-03-07

## 2023-03-07 PROBLEM — R10.13 ABDOMINAL PAIN, EPIGASTRIC: Status: ACTIVE | Noted: 2023-03-07

## 2023-03-07 PROBLEM — K08.409 WISDOM TEETH REMOVED: Status: ACTIVE | Noted: 2023-03-07

## 2023-03-07 PROBLEM — R44.3 HALLUCINATION: Status: ACTIVE | Noted: 2023-03-07

## 2023-03-07 PROBLEM — G47.19 EXCESSIVE DAYTIME SLEEPINESS: Status: ACTIVE | Noted: 2023-03-07

## 2023-03-07 PROBLEM — N76.0 BV (BACTERIAL VAGINOSIS): Status: ACTIVE | Noted: 2023-03-07

## 2023-03-07 PROBLEM — K80.50 CHOLEDOCHOLITHIASIS: Status: ACTIVE | Noted: 2023-03-07

## 2023-03-07 PROBLEM — L29.9 PRURITUS: Status: ACTIVE | Noted: 2023-03-07

## 2023-03-07 PROBLEM — R76.8 ANTI-RNP ANTIBODIES PRESENT: Status: ACTIVE | Noted: 2023-03-07

## 2023-03-07 PROBLEM — K08.89 PAIN, DENTAL: Status: ACTIVE | Noted: 2023-03-07

## 2023-03-07 PROBLEM — M54.16 CHRONIC LUMBAR RADICULOPATHY: Status: ACTIVE | Noted: 2023-03-07

## 2023-03-07 PROBLEM — M79.18 MYOFASCIAL PAIN: Status: ACTIVE | Noted: 2023-03-07

## 2023-03-07 PROBLEM — R52 DIFFUSE PAIN: Status: ACTIVE | Noted: 2023-03-07

## 2023-03-07 PROBLEM — I26.99 PULMONARY EMBOLISM (MULTI): Status: ACTIVE | Noted: 2023-03-07

## 2023-03-07 PROBLEM — R41.840 ATTENTION OR CONCENTRATION DEFICIT: Status: ACTIVE | Noted: 2023-03-07

## 2023-03-07 PROBLEM — R07.89 CHEST PAIN, ATYPICAL: Status: ACTIVE | Noted: 2023-03-07

## 2023-03-07 PROBLEM — M50.20 CERVICAL DISC HERNIATION: Status: ACTIVE | Noted: 2023-03-07

## 2023-03-07 PROBLEM — M54.50 LUMBAR SPINE PAIN: Status: ACTIVE | Noted: 2023-03-07

## 2023-03-07 PROBLEM — G56.03 BILATERAL CARPAL TUNNEL SYNDROME: Status: ACTIVE | Noted: 2023-03-07

## 2023-03-07 PROBLEM — G43.119 INTRACTABLE MIGRAINE WITH AURA WITHOUT STATUS MIGRAINOSUS: Status: ACTIVE | Noted: 2023-03-07

## 2023-03-07 PROBLEM — L73.2 HIDRADENITIS SUPPURATIVA: Status: ACTIVE | Noted: 2023-03-07

## 2023-03-07 PROBLEM — I69.919 COGNITIVE DEFICITS AS LATE EFFECT OF CEREBROVASCULAR DISEASE: Status: ACTIVE | Noted: 2023-03-07

## 2023-03-07 PROBLEM — M24.212 LIGAMENTOUS LAXITY OF LEFT SHOULDER: Status: ACTIVE | Noted: 2023-03-07

## 2023-03-07 PROBLEM — M24.211 LIGAMENTOUS LAXITY OF RIGHT SHOULDER: Status: ACTIVE | Noted: 2023-03-07

## 2023-03-07 PROBLEM — M25.512 BILATERAL SHOULDER PAIN: Status: ACTIVE | Noted: 2023-03-07

## 2023-03-07 PROBLEM — S43.432A SUPERIOR LABRUM ANTERIOR-TO-POSTERIOR (SLAP) TEAR OF LEFT SHOULDER: Status: ACTIVE | Noted: 2023-03-07

## 2023-03-07 PROBLEM — M47.816 LUMBAR SPONDYLOSIS: Status: ACTIVE | Noted: 2023-03-07

## 2023-03-07 PROBLEM — R10.84 GENERALIZED ABDOMINAL PAIN: Status: ACTIVE | Noted: 2023-03-07

## 2023-03-07 PROBLEM — F32.A DEPRESSION: Status: ACTIVE | Noted: 2023-03-07

## 2023-03-07 PROBLEM — R14.0 ABDOMINAL BLOATING: Status: ACTIVE | Noted: 2023-03-07

## 2023-03-07 PROBLEM — N64.52 NIPPLE DISCHARGE: Status: ACTIVE | Noted: 2023-03-07

## 2023-03-07 PROBLEM — F41.9 ANXIETY: Status: ACTIVE | Noted: 2023-03-07

## 2023-03-07 RX ORDER — ESCITALOPRAM OXALATE 10 MG/1
1 TABLET ORAL DAILY
COMMUNITY
End: 2023-03-31 | Stop reason: SDUPTHER

## 2023-03-07 RX ORDER — TIZANIDINE 4 MG/1
1-2 TABLET ORAL NIGHTLY PRN
COMMUNITY
End: 2023-03-31 | Stop reason: SDUPTHER

## 2023-03-07 RX ORDER — WARFARIN 6 MG/1
TABLET ORAL
COMMUNITY
End: 2023-05-08 | Stop reason: ALTCHOICE

## 2023-03-07 RX ORDER — OMEPRAZOLE 20 MG/1
20 CAPSULE, DELAYED RELEASE ORAL
COMMUNITY
End: 2023-03-31 | Stop reason: ALTCHOICE

## 2023-03-07 RX ORDER — ETONOGESTREL 68 MG/1
IMPLANT SUBCUTANEOUS
COMMUNITY
End: 2023-11-28 | Stop reason: ALTCHOICE

## 2023-03-07 RX ORDER — TRAMADOL HYDROCHLORIDE 50 MG/1
50 TABLET ORAL 2 TIMES DAILY PRN
COMMUNITY
End: 2023-03-31 | Stop reason: ALTCHOICE

## 2023-03-07 RX ORDER — LIDOCAINE 50 MG/G
1 PATCH TOPICAL
COMMUNITY
End: 2023-03-31 | Stop reason: ALTCHOICE

## 2023-03-07 RX ORDER — OXYCODONE AND ACETAMINOPHEN 5; 325 MG/1; MG/1
1 TABLET ORAL EVERY 6 HOURS PRN
COMMUNITY
End: 2023-03-31 | Stop reason: ALTCHOICE

## 2023-03-07 RX ORDER — WARFARIN SODIUM 5 MG/1
5 TABLET ORAL DAILY
COMMUNITY
End: 2023-05-08 | Stop reason: ALTCHOICE

## 2023-03-13 ENCOUNTER — APPOINTMENT (OUTPATIENT)
Dept: PRIMARY CARE | Facility: CLINIC | Age: 28
End: 2023-03-13
Payer: COMMERCIAL

## 2023-03-13 ENCOUNTER — ANTICOAGULATION - WARFARIN VISIT (OUTPATIENT)
Dept: PRIMARY CARE | Facility: CLINIC | Age: 28
End: 2023-03-13

## 2023-03-13 DIAGNOSIS — I26.99 ACUTE PULMONARY EMBOLISM, UNSPECIFIED PULMONARY EMBOLISM TYPE, UNSPECIFIED WHETHER ACUTE COR PULMONALE PRESENT (MULTI): Primary | ICD-10-CM

## 2023-03-14 ENCOUNTER — TELEPHONE (OUTPATIENT)
Dept: PRIMARY CARE | Facility: CLINIC | Age: 28
End: 2023-03-14

## 2023-03-14 ENCOUNTER — APPOINTMENT (OUTPATIENT)
Dept: PRIMARY CARE | Facility: CLINIC | Age: 28
End: 2023-03-14
Payer: COMMERCIAL

## 2023-03-14 ENCOUNTER — ANTICOAGULATION - WARFARIN VISIT (OUTPATIENT)
Dept: PRIMARY CARE | Facility: CLINIC | Age: 28
End: 2023-03-14

## 2023-03-14 DIAGNOSIS — I26.99 PULMONARY EMBOLISM, UNSPECIFIED CHRONICITY, UNSPECIFIED PULMONARY EMBOLISM TYPE, UNSPECIFIED WHETHER ACUTE COR PULMONALE PRESENT (MULTI): ICD-10-CM

## 2023-03-14 PROCEDURE — 85610 PROTHROMBIN TIME: CPT

## 2023-03-14 NOTE — TELEPHONE ENCOUNTER
PATIENT CAME IN TODAY FOR INR CHECK  YESTERDAY WAS 2.4 AND TODAY IS 2.7  WHAT SCHEDULE AND WHEN TO RECHECK

## 2023-03-20 ENCOUNTER — APPOINTMENT (OUTPATIENT)
Dept: PRIMARY CARE | Facility: CLINIC | Age: 28
End: 2023-03-20
Payer: COMMERCIAL

## 2023-03-20 LAB — POC INR: 5.8

## 2023-03-22 ENCOUNTER — APPOINTMENT (OUTPATIENT)
Dept: PRIMARY CARE | Facility: CLINIC | Age: 28
End: 2023-03-22
Payer: COMMERCIAL

## 2023-03-22 ENCOUNTER — ANTICOAGULATION - WARFARIN VISIT (OUTPATIENT)
Dept: PRIMARY CARE | Facility: CLINIC | Age: 28
End: 2023-03-22

## 2023-03-22 DIAGNOSIS — I63.9 CEREBROVASCULAR ACCIDENT (CVA), UNSPECIFIED MECHANISM (MULTI): ICD-10-CM

## 2023-03-22 LAB — POC INR: 1.9

## 2023-03-22 PROCEDURE — 85610 PROTHROMBIN TIME: CPT | Performed by: STUDENT IN AN ORGANIZED HEALTH CARE EDUCATION/TRAINING PROGRAM

## 2023-03-27 ENCOUNTER — APPOINTMENT (OUTPATIENT)
Dept: PRIMARY CARE | Facility: CLINIC | Age: 28
End: 2023-03-27
Payer: COMMERCIAL

## 2023-03-28 ENCOUNTER — TELEPHONE (OUTPATIENT)
Dept: PRIMARY CARE | Facility: CLINIC | Age: 28
End: 2023-03-28
Payer: COMMERCIAL

## 2023-03-28 NOTE — TELEPHONE ENCOUNTER
Patient's inr is 1.9.  Came in on Friday and it was 1.9. and current schedule is  wed & sat 5mg and the rest of the time is 2.5mg. (and has a scheduled appt this fri 3/31)

## 2023-03-31 ENCOUNTER — OFFICE VISIT (OUTPATIENT)
Dept: PRIMARY CARE | Facility: CLINIC | Age: 28
End: 2023-03-31
Payer: COMMERCIAL

## 2023-03-31 ENCOUNTER — LAB (OUTPATIENT)
Dept: LAB | Facility: LAB | Age: 28
End: 2023-03-31
Payer: COMMERCIAL

## 2023-03-31 VITALS
DIASTOLIC BLOOD PRESSURE: 90 MMHG | HEIGHT: 67 IN | WEIGHT: 265 LBS | SYSTOLIC BLOOD PRESSURE: 120 MMHG | BODY MASS INDEX: 41.59 KG/M2

## 2023-03-31 DIAGNOSIS — F41.9 ANXIETY: Primary | ICD-10-CM

## 2023-03-31 DIAGNOSIS — Z00.00 HEALTHCARE MAINTENANCE: ICD-10-CM

## 2023-03-31 DIAGNOSIS — I26.99 PULMONARY EMBOLISM, UNSPECIFIED CHRONICITY, UNSPECIFIED PULMONARY EMBOLISM TYPE, UNSPECIFIED WHETHER ACUTE COR PULMONALE PRESENT (MULTI): ICD-10-CM

## 2023-03-31 LAB
ALANINE AMINOTRANSFERASE (SGPT) (U/L) IN SER/PLAS: 47 U/L (ref 7–45)
ALBUMIN (G/DL) IN SER/PLAS: 4.9 G/DL (ref 3.4–5)
ALKALINE PHOSPHATASE (U/L) IN SER/PLAS: 64 U/L (ref 33–110)
ANION GAP IN SER/PLAS: 18 MMOL/L (ref 10–20)
ASPARTATE AMINOTRANSFERASE (SGOT) (U/L) IN SER/PLAS: 37 U/L (ref 9–39)
BILIRUBIN TOTAL (MG/DL) IN SER/PLAS: 0.6 MG/DL (ref 0–1.2)
CALCIUM (MG/DL) IN SER/PLAS: 10.4 MG/DL (ref 8.6–10.6)
CARBON DIOXIDE, TOTAL (MMOL/L) IN SER/PLAS: 19 MMOL/L (ref 21–32)
CHLORIDE (MMOL/L) IN SER/PLAS: 106 MMOL/L (ref 98–107)
COBALAMIN (VITAMIN B12) (PG/ML) IN SER/PLAS: 241 PG/ML (ref 211–911)
CREATININE (MG/DL) IN SER/PLAS: 0.73 MG/DL (ref 0.5–1.05)
ERYTHROCYTE DISTRIBUTION WIDTH (RATIO) BY AUTOMATED COUNT: 13.5 % (ref 11.5–14.5)
ERYTHROCYTE MEAN CORPUSCULAR HEMOGLOBIN CONCENTRATION (G/DL) BY AUTOMATED: 32.8 G/DL (ref 32–36)
ERYTHROCYTE MEAN CORPUSCULAR VOLUME (FL) BY AUTOMATED COUNT: 101 FL (ref 80–100)
ERYTHROCYTES (10*6/UL) IN BLOOD BY AUTOMATED COUNT: 4.22 X10E12/L (ref 4–5.2)
FOLATE (NG/ML) IN SER/PLAS: 21.1 NG/ML
GFR FEMALE: >90 ML/MIN/1.73M2
GLUCOSE (MG/DL) IN SER/PLAS: 91 MG/DL (ref 74–99)
HEMATOCRIT (%) IN BLOOD BY AUTOMATED COUNT: 42.7 % (ref 36–46)
HEMOGLOBIN (G/DL) IN BLOOD: 14 G/DL (ref 12–16)
LEUKOCYTES (10*3/UL) IN BLOOD BY AUTOMATED COUNT: 4.1 X10E9/L (ref 4.4–11.3)
MAGNESIUM (MG/DL) IN SER/PLAS: 2 MG/DL (ref 1.6–2.4)
NRBC (PER 100 WBCS) BY AUTOMATED COUNT: 0 /100 WBC (ref 0–0)
PLATELETS (10*3/UL) IN BLOOD AUTOMATED COUNT: 352 X10E9/L (ref 150–450)
POTASSIUM (MMOL/L) IN SER/PLAS: 4.4 MMOL/L (ref 3.5–5.3)
PROTEIN TOTAL: 7.7 G/DL (ref 6.4–8.2)
SODIUM (MMOL/L) IN SER/PLAS: 139 MMOL/L (ref 136–145)
THYROTROPIN (MIU/L) IN SER/PLAS BY DETECTION LIMIT <= 0.05 MIU/L: 0.83 MIU/L (ref 0.44–3.98)
UREA NITROGEN (MG/DL) IN SER/PLAS: 12 MG/DL (ref 6–23)

## 2023-03-31 PROCEDURE — 3008F BODY MASS INDEX DOCD: CPT | Performed by: STUDENT IN AN ORGANIZED HEALTH CARE EDUCATION/TRAINING PROGRAM

## 2023-03-31 PROCEDURE — 80053 COMPREHEN METABOLIC PANEL: CPT

## 2023-03-31 PROCEDURE — 99214 OFFICE O/P EST MOD 30 MIN: CPT | Performed by: STUDENT IN AN ORGANIZED HEALTH CARE EDUCATION/TRAINING PROGRAM

## 2023-03-31 PROCEDURE — 82607 VITAMIN B-12: CPT

## 2023-03-31 PROCEDURE — 36415 COLL VENOUS BLD VENIPUNCTURE: CPT

## 2023-03-31 PROCEDURE — 82746 ASSAY OF FOLIC ACID SERUM: CPT

## 2023-03-31 PROCEDURE — 83735 ASSAY OF MAGNESIUM: CPT

## 2023-03-31 PROCEDURE — 85027 COMPLETE CBC AUTOMATED: CPT

## 2023-03-31 PROCEDURE — 84443 ASSAY THYROID STIM HORMONE: CPT

## 2023-03-31 RX ORDER — ESCITALOPRAM OXALATE 10 MG/1
10 TABLET ORAL DAILY
Qty: 90 TABLET | Refills: 1 | Status: SHIPPED | OUTPATIENT
Start: 2023-03-31 | End: 2023-05-17 | Stop reason: SDUPTHER

## 2023-03-31 RX ORDER — TIZANIDINE 4 MG/1
2 TABLET ORAL
COMMUNITY
Start: 2021-01-18 | End: 2023-05-17 | Stop reason: ALTCHOICE

## 2023-03-31 RX ORDER — GABAPENTIN 300 MG/1
1 CAPSULE ORAL 3 TIMES DAILY
COMMUNITY
Start: 2023-03-16 | End: 2023-05-17 | Stop reason: ALTCHOICE

## 2023-03-31 NOTE — PROGRESS NOTES
"Follow and med refill and check inr    Subjective   Patient ID: Liliana Jay is a 28 y.o. female who presents for Follow-up, Med Refill, and pt/inr check.    HPI presents for follow-up, medication refill, INR check    INR today of 2.3, current regimen is warfarin 5 mg on Tuesday, Wednesday, Saturday and 2.5 mg remainder of days    Starting about 1.5 weeks ago developed numbness that starts in her fingertips and now is traveled up to about her elbows    Has been diagnosed with gastroparesis through the [facility] and will be following up with the gastroparesis clinic    Review of Systems  8 point review of systems is otherwise negative unless mentioned on HPI      Objective   /90   Ht 1.702 m (5' 7\")   Wt 120 kg (265 lb)   BMI 41.50 kg/m²     Physical Exam  General: No acute distress  HEENT: EOMI  CV: Regular rate and rhythm, normal S1 and S2, no murmurs  Pulm: Clear to auscultation bilaterally, no wheezings, rales or rhonchi  Abd: Nondistended  MSK: 5/5 strength in all extremities  Skin: No rashes   Lymphatic: No lymphadenopathy    Neuro: Decreased sensation to light touch from fingertips to elbow    Assessment/Plan       History of VTE  Prothrombin gene disorder  -On lifetime anticoagulation  -INR today of 2.3, continue current regimen of 5 mg Tuesday, Wednesday, Saturday, 2.5 mg remainder of days.  Repeat INR next week    Numbness starting from fingertips, elbows  -Obtain routine lab work including B12 and folate  -Discussed possible next up with EMG    Gastroparesis  -Set to follow-up with gastroparesis clinic through [facility]    Anxiety  -Continue Lexapro    RTC 3 months, repeat INR in 1 week    This note was dictated by speech recognition. Minor errors in transcription may be present.           "

## 2023-04-10 ENCOUNTER — ANTICOAGULATION - WARFARIN VISIT (OUTPATIENT)
Dept: PRIMARY CARE | Facility: CLINIC | Age: 28
End: 2023-04-10

## 2023-04-10 ENCOUNTER — CLINICAL SUPPORT (OUTPATIENT)
Dept: PRIMARY CARE | Facility: CLINIC | Age: 28
End: 2023-04-10
Payer: COMMERCIAL

## 2023-04-10 ENCOUNTER — PATIENT OUTREACH (OUTPATIENT)
Dept: CARE COORDINATION | Facility: CLINIC | Age: 28
End: 2023-04-10

## 2023-04-10 ENCOUNTER — DOCUMENTATION (OUTPATIENT)
Dept: CARE COORDINATION | Facility: CLINIC | Age: 28
End: 2023-04-10
Payer: COMMERCIAL

## 2023-04-10 ENCOUNTER — TELEPHONE (OUTPATIENT)
Dept: PRIMARY CARE | Facility: CLINIC | Age: 28
End: 2023-04-10

## 2023-04-10 DIAGNOSIS — I26.99 PULMONARY EMBOLISM, UNSPECIFIED CHRONICITY, UNSPECIFIED PULMONARY EMBOLISM TYPE, UNSPECIFIED WHETHER ACUTE COR PULMONALE PRESENT (MULTI): ICD-10-CM

## 2023-04-10 DIAGNOSIS — R07.9 CHEST PAIN, UNSPECIFIED TYPE: ICD-10-CM

## 2023-04-10 DIAGNOSIS — E53.8 VITAMIN B12 DEFICIENCY: ICD-10-CM

## 2023-04-10 LAB
POC INR: 1.7
POC PROTHROMBIN TIME: NORMAL

## 2023-04-10 PROCEDURE — 96372 THER/PROPH/DIAG INJ SC/IM: CPT | Performed by: STUDENT IN AN ORGANIZED HEALTH CARE EDUCATION/TRAINING PROGRAM

## 2023-04-10 PROCEDURE — 85610 PROTHROMBIN TIME: CPT | Performed by: STUDENT IN AN ORGANIZED HEALTH CARE EDUCATION/TRAINING PROGRAM

## 2023-04-10 RX ADMIN — CYANOCOBALAMIN 1000 MCG: 1000 INJECTION, SOLUTION INTRAMUSCULAR; SUBCUTANEOUS at 12:53

## 2023-04-10 NOTE — PROGRESS NOTES
Discharge Facility:[facility]  Discharge Diagnosis:R07.9 Chest pain  Admission Date:4/4/23  Discharge Date:4/7/23    PCP appt:4/12/23      Engagement  Call Start Time: 1411 (4/10/2023  2:16 PM)    Medications  Medications reviewed with patient/caregiver?: Not applicable (4/10/2023  2:16 PM)  Is the patient taking all medications as directed (includes completed medication regime)?: Not applicable (4/10/2023  2:16 PM)  Care Management Interventions: Provided patient education (4/10/2023  2:16 PM)    Appointments  Does the patient have a primary care provider?: Yes (4/10/2023  2:16 PM)  Care Management Interventions: Verified appointment date/time/provider (4/10/2023  2:16 PM)  Has the patient kept scheduled appointments due by today?: Yes (4/10/2023  2:16 PM)  Care Management Interventions: Advised patient to keep appointment; Educated on importance of keeping appointment (4/10/2023  2:16 PM)    Self Management  Has home health visited the patient within 72 hours of discharge?: Not applicable (4/10/2023  2:16 PM)    Patient Teaching  Does the patient have access to their discharge instructions?: Yes (4/10/2023  2:16 PM)  Care Management Interventions: Reviewed instructions with patient (4/10/2023  2:16 PM)  What is the patient's perception of their health status since discharge?: Same (4/10/2023  2:16 PM)  Is the patient/caregiver able to teach back the hierarchy of who to call/visit for symptoms/problems? PCP, Specialist, Home Health nurse, Urgent Care, ED, 911: Yes (4/10/2023  2:16 PM)

## 2023-04-10 NOTE — TELEPHONE ENCOUNTER
Pt/ inr today was 1.7 and taking same schedule (5mg on wed & sat, 2.5 the rest of the time) was in hospital last week and discharged on Friday and where her inr was 2.4 (still on the same schedule) I also gave patient a vitamin b12 injection.

## 2023-04-11 PROBLEM — R79.1 SUBTHERAPEUTIC INTERNATIONAL NORMALIZED RATIO (INR): Status: ACTIVE | Noted: 2023-02-19

## 2023-04-11 PROBLEM — M79.604 BILATERAL LEG PAIN: Status: ACTIVE | Noted: 2023-02-19

## 2023-04-11 PROBLEM — M25.519 SHOULDER PAIN: Status: ACTIVE | Noted: 2023-04-11

## 2023-04-11 PROBLEM — E66.813 OBESITY, CLASS III, BMI 40-49.9 (MORBID OBESITY): Status: ACTIVE | Noted: 2022-10-15

## 2023-04-11 PROBLEM — J02.9 PHARYNGITIS: Status: ACTIVE | Noted: 2022-12-16

## 2023-04-11 PROBLEM — I26.99 PE (PULMONARY THROMBOEMBOLISM) (MULTI): Status: ACTIVE | Noted: 2020-06-16

## 2023-04-11 PROBLEM — M54.12 CERVICAL RADICULOPATHY AT C6: Status: ACTIVE | Noted: 2023-04-11

## 2023-04-11 PROBLEM — E66.01 OBESITY, CLASS III, BMI 40-49.9 (MORBID OBESITY) (MULTI): Status: ACTIVE | Noted: 2022-10-15

## 2023-04-11 PROBLEM — F41.1 GENERALIZED ANXIETY DISORDER: Status: ACTIVE | Noted: 2021-09-20

## 2023-04-11 PROBLEM — Z97.5 NEXPLANON IN PLACE: Status: ACTIVE | Noted: 2023-04-11

## 2023-04-11 PROBLEM — Z86.73 HISTORY OF CVA (CEREBROVASCULAR ACCIDENT): Status: ACTIVE | Noted: 2023-02-18

## 2023-04-11 PROBLEM — I26.99 PULMONARY EMBOLUS AND INFARCTION (MULTI): Status: ACTIVE | Noted: 2023-03-09

## 2023-04-11 PROBLEM — K58.9 IBS (IRRITABLE BOWEL SYNDROME): Status: ACTIVE | Noted: 2023-04-11

## 2023-04-11 PROBLEM — Z76.5 DRUG-SEEKING BEHAVIOR: Status: ACTIVE | Noted: 2020-06-16

## 2023-04-11 PROBLEM — M79.605 BILATERAL LEG PAIN: Status: ACTIVE | Noted: 2023-02-19

## 2023-04-11 PROBLEM — I26.99 RECURRENT PULMONARY EMBOLI (MULTI): Status: ACTIVE | Noted: 2022-10-15

## 2023-04-11 PROBLEM — D68.2: Status: ACTIVE | Noted: 2023-04-11

## 2023-04-11 PROBLEM — R06.02 SOB (SHORTNESS OF BREATH) ON EXERTION: Status: ACTIVE | Noted: 2023-02-19

## 2023-04-11 PROBLEM — I82.409 DVT OF LOWER LIMB, ACUTE (MULTI): Status: ACTIVE | Noted: 2022-10-15

## 2023-04-11 PROBLEM — S93.409A ANKLE SPRAIN: Status: ACTIVE | Noted: 2022-12-16

## 2023-04-11 PROBLEM — M54.16 LUMBAR RADICULOPATHY: Status: ACTIVE | Noted: 2023-04-11

## 2023-04-11 PROBLEM — R11.2 NAUSEA AND VOMITING: Status: ACTIVE | Noted: 2023-04-11

## 2023-04-11 PROBLEM — F39 MOOD DISORDER (CMS-HCC): Status: ACTIVE | Noted: 2023-02-18

## 2023-04-11 PROBLEM — D68.52 PROTHROMBIN GENE MUTATION (MULTI): Status: ACTIVE | Noted: 2020-06-18

## 2023-04-11 PROBLEM — R11.0 CHRONIC NAUSEA: Status: ACTIVE | Noted: 2023-02-18

## 2023-04-11 PROBLEM — R07.9 CHEST PAIN: Status: ACTIVE | Noted: 2023-02-19

## 2023-04-11 PROBLEM — Z86.2 HISTORY OF PROTHROMBIN MUTATION: Status: ACTIVE | Noted: 2023-02-19

## 2023-04-11 PROBLEM — L73.2 HIDRADENITIS: Status: ACTIVE | Noted: 2023-04-11

## 2023-04-11 PROBLEM — M54.17 LUMBOSACRAL RADICULITIS: Status: ACTIVE | Noted: 2023-04-11

## 2023-04-11 PROBLEM — K83.5: Status: ACTIVE | Noted: 2023-04-11

## 2023-04-11 PROBLEM — F17.200 NICOTINE USE DISORDER: Status: ACTIVE | Noted: 2022-10-15

## 2023-04-11 PROBLEM — R10.12 ABDOMINAL PAIN, ACUTE, LEFT UPPER QUADRANT: Status: ACTIVE | Noted: 2023-04-11

## 2023-04-11 PROBLEM — F14.10 COCAINE ABUSE (MULTI): Status: ACTIVE | Noted: 2022-10-17

## 2023-04-11 PROBLEM — M19.90 DJD (DEGENERATIVE JOINT DISEASE): Status: ACTIVE | Noted: 2023-04-11

## 2023-04-11 PROBLEM — M54.9 BACK PAIN, ACUTE: Status: ACTIVE | Noted: 2023-04-11

## 2023-04-11 PROBLEM — R10.11 ABDOMINAL PAIN, ACUTE, RIGHT UPPER QUADRANT: Status: ACTIVE | Noted: 2023-04-11

## 2023-04-18 ENCOUNTER — ANTICOAGULATION - WARFARIN VISIT (OUTPATIENT)
Dept: PRIMARY CARE | Facility: CLINIC | Age: 28
End: 2023-04-18

## 2023-04-18 ENCOUNTER — PATIENT OUTREACH (OUTPATIENT)
Dept: CARE COORDINATION | Facility: CLINIC | Age: 28
End: 2023-04-18

## 2023-04-18 ENCOUNTER — CLINICAL SUPPORT (OUTPATIENT)
Dept: PRIMARY CARE | Facility: CLINIC | Age: 28
End: 2023-04-18
Payer: COMMERCIAL

## 2023-04-18 DIAGNOSIS — I26.99 PULMONARY EMBOLISM, UNSPECIFIED CHRONICITY, UNSPECIFIED PULMONARY EMBOLISM TYPE, UNSPECIFIED WHETHER ACUTE COR PULMONALE PRESENT (MULTI): ICD-10-CM

## 2023-04-18 DIAGNOSIS — R79.89 LOW VITAMIN B12 LEVEL: ICD-10-CM

## 2023-04-18 LAB
POC INR: 2.8
POC PROTHROMBIN TIME: NORMAL

## 2023-04-18 PROCEDURE — 96372 THER/PROPH/DIAG INJ SC/IM: CPT | Performed by: STUDENT IN AN ORGANIZED HEALTH CARE EDUCATION/TRAINING PROGRAM

## 2023-04-18 PROCEDURE — 85610 PROTHROMBIN TIME: CPT | Performed by: STUDENT IN AN ORGANIZED HEALTH CARE EDUCATION/TRAINING PROGRAM

## 2023-04-18 RX ADMIN — CYANOCOBALAMIN 1000 MCG: 1000 INJECTION, SOLUTION INTRAMUSCULAR; SUBCUTANEOUS at 12:09

## 2023-04-18 NOTE — PROGRESS NOTES
Call regarding appt with PCP on 4/12/23 but patient no showed after hospitalization for chest pain.  At time of outreach call the patient feels as if their condition has improved since last visit.  Patient verbalized understanding plan of care   Any further questions or concerns at this time for PCP? No  Does patient appear to have CCM needs and will need referral to nurse after call back? No

## 2023-04-26 ENCOUNTER — ANTICOAGULATION - WARFARIN VISIT (OUTPATIENT)
Dept: PRIMARY CARE | Facility: CLINIC | Age: 28
End: 2023-04-26

## 2023-04-26 DIAGNOSIS — I26.99 PULMONARY EMBOLISM, UNSPECIFIED CHRONICITY, UNSPECIFIED PULMONARY EMBOLISM TYPE, UNSPECIFIED WHETHER ACUTE COR PULMONALE PRESENT (MULTI): ICD-10-CM

## 2023-04-26 LAB
POC INR: 2.7
POC PROTHROMBIN TIME: NORMAL

## 2023-04-26 PROCEDURE — 85610 PROTHROMBIN TIME: CPT | Performed by: STUDENT IN AN ORGANIZED HEALTH CARE EDUCATION/TRAINING PROGRAM

## 2023-05-03 ENCOUNTER — CLINICAL SUPPORT (OUTPATIENT)
Dept: PRIMARY CARE | Facility: CLINIC | Age: 28
End: 2023-05-03
Payer: COMMERCIAL

## 2023-05-03 ENCOUNTER — ANTICOAGULATION - WARFARIN VISIT (OUTPATIENT)
Dept: PRIMARY CARE | Facility: CLINIC | Age: 28
End: 2023-05-03

## 2023-05-03 DIAGNOSIS — R79.89 LOW VITAMIN B12 LEVEL: ICD-10-CM

## 2023-05-03 DIAGNOSIS — I26.99 PULMONARY EMBOLISM, UNSPECIFIED CHRONICITY, UNSPECIFIED PULMONARY EMBOLISM TYPE, UNSPECIFIED WHETHER ACUTE COR PULMONALE PRESENT (MULTI): ICD-10-CM

## 2023-05-03 LAB
POC INR: 2.6
POC PROTHROMBIN TIME: NORMAL

## 2023-05-03 PROCEDURE — 85610 PROTHROMBIN TIME: CPT | Performed by: STUDENT IN AN ORGANIZED HEALTH CARE EDUCATION/TRAINING PROGRAM

## 2023-05-03 PROCEDURE — 96372 THER/PROPH/DIAG INJ SC/IM: CPT | Performed by: STUDENT IN AN ORGANIZED HEALTH CARE EDUCATION/TRAINING PROGRAM

## 2023-05-03 RX ORDER — CYANOCOBALAMIN 1000 UG/ML
1000 INJECTION, SOLUTION INTRAMUSCULAR; SUBCUTANEOUS ONCE
Status: COMPLETED | OUTPATIENT
Start: 2023-05-03 | End: 2023-05-03

## 2023-05-03 RX ORDER — CYANOCOBALAMIN 1000 UG/ML
1000 INJECTION, SOLUTION INTRAMUSCULAR; SUBCUTANEOUS ONCE
Status: COMPLETED | OUTPATIENT
Start: 2023-04-18 | End: 2023-04-18

## 2023-05-03 RX ADMIN — CYANOCOBALAMIN 1000 MCG: 1000 INJECTION, SOLUTION INTRAMUSCULAR; SUBCUTANEOUS at 11:56

## 2023-05-08 DIAGNOSIS — I26.99 PULMONARY EMBOLISM, UNSPECIFIED CHRONICITY, UNSPECIFIED PULMONARY EMBOLISM TYPE, UNSPECIFIED WHETHER ACUTE COR PULMONALE PRESENT (MULTI): Primary | ICD-10-CM

## 2023-05-17 ENCOUNTER — ANTICOAGULATION - WARFARIN VISIT (OUTPATIENT)
Dept: PRIMARY CARE | Facility: CLINIC | Age: 28
End: 2023-05-17

## 2023-05-17 ENCOUNTER — LAB (OUTPATIENT)
Dept: LAB | Facility: LAB | Age: 28
End: 2023-05-17
Payer: COMMERCIAL

## 2023-05-17 ENCOUNTER — OFFICE VISIT (OUTPATIENT)
Dept: PRIMARY CARE | Facility: CLINIC | Age: 28
End: 2023-05-17
Payer: COMMERCIAL

## 2023-05-17 VITALS — WEIGHT: 261 LBS | DIASTOLIC BLOOD PRESSURE: 100 MMHG | SYSTOLIC BLOOD PRESSURE: 130 MMHG | BODY MASS INDEX: 40.88 KG/M2

## 2023-05-17 DIAGNOSIS — M54.50 BILATERAL LOW BACK PAIN WITHOUT SCIATICA, UNSPECIFIED CHRONICITY: ICD-10-CM

## 2023-05-17 DIAGNOSIS — F32.A ANXIETY AND DEPRESSION: Primary | ICD-10-CM

## 2023-05-17 DIAGNOSIS — R20.0 NUMBNESS: ICD-10-CM

## 2023-05-17 DIAGNOSIS — F41.9 ANXIETY: ICD-10-CM

## 2023-05-17 DIAGNOSIS — F41.9 ANXIETY AND DEPRESSION: Primary | ICD-10-CM

## 2023-05-17 PROBLEM — I26.99 PULMONARY EMBOLISM, BILATERAL (MULTI): Status: ACTIVE | Noted: 2023-02-18

## 2023-05-17 PROCEDURE — 99214 OFFICE O/P EST MOD 30 MIN: CPT | Performed by: STUDENT IN AN ORGANIZED HEALTH CARE EDUCATION/TRAINING PROGRAM

## 2023-05-17 PROCEDURE — 82607 VITAMIN B-12: CPT

## 2023-05-17 PROCEDURE — 3075F SYST BP GE 130 - 139MM HG: CPT | Performed by: STUDENT IN AN ORGANIZED HEALTH CARE EDUCATION/TRAINING PROGRAM

## 2023-05-17 PROCEDURE — 3008F BODY MASS INDEX DOCD: CPT | Performed by: STUDENT IN AN ORGANIZED HEALTH CARE EDUCATION/TRAINING PROGRAM

## 2023-05-17 PROCEDURE — 36415 COLL VENOUS BLD VENIPUNCTURE: CPT

## 2023-05-17 PROCEDURE — 3080F DIAST BP >= 90 MM HG: CPT | Performed by: STUDENT IN AN ORGANIZED HEALTH CARE EDUCATION/TRAINING PROGRAM

## 2023-05-17 RX ORDER — RIMEGEPANT SULFATE 75 MG/75MG
75 TABLET, ORALLY DISINTEGRATING ORAL
COMMUNITY
Start: 2023-04-17

## 2023-05-17 RX ORDER — DIPHENHYDRAMINE HCL 50 MG
CAPSULE ORAL
COMMUNITY
Start: 2023-04-25 | End: 2023-11-28 | Stop reason: ALTCHOICE

## 2023-05-17 RX ORDER — TIZANIDINE 4 MG/1
4 TABLET ORAL EVERY 8 HOURS PRN
Qty: 30 TABLET | Refills: 3 | Status: SHIPPED | OUTPATIENT
Start: 2023-05-17 | End: 2023-06-05

## 2023-05-17 RX ORDER — ESCITALOPRAM OXALATE 10 MG/1
10 TABLET ORAL DAILY
Qty: 90 TABLET | Refills: 1 | Status: CANCELLED | OUTPATIENT
Start: 2023-05-17

## 2023-05-17 RX ORDER — ESCITALOPRAM OXALATE 20 MG/1
20 TABLET ORAL DAILY
Qty: 90 TABLET | Refills: 1 | Status: SHIPPED | OUTPATIENT
Start: 2023-05-17 | End: 2023-10-23

## 2023-05-17 NOTE — PROGRESS NOTES
Subjective   Patient ID: Liliana Jay is a 28 y.o. female who presents for Follow-up and Med Refill.  HPI  Patient is here today for follow up visit.    She reports consistent neck pain and low back pain. She does get some relief with muscle relaxer use. She takes Tylenol 4-5x/week for her pains. She has been following with [facility] hematology, is now maintained on Eliquis rather than Warfarin for anticoagulation. She reports that her depression has been stable but her anxiety has been poorly controlled. She has been having a hard time leaving the house due to anxiety. She denies any thoughts of wanting to harm herself or others.     Review of Systems  12-point ROS was reviewed and is negative, unless otherwise noted in HPI    Objective   Visit Vitals  BP (!) 130/100      Physical Exam  GEN: alert, conversant, NAD  HEENT: PERRL, EOMI, MMM  NECK: supple, no LAD appreciated  CHEST: CTAB  CV: S1, S2, RRR, no murmurs appreciated  ABD: soft, NT, ND, obese  EXT: no significant LE edema  SKIN: warm, dry    Assessment/Plan   #History of VTE  #Prothrombin gene disorder  - On lifetime anticoagulation, on Eliquis  - Following with Hematology at [facility]     #chronic neck pain  #chronic low back pain  - Did not get any relief from gabapentin  - Has been seen by Pain mgmt, not a good candidate for injection d/t AC use  - Has been on zanaflex for the chronic pain, will increase from BID to TID  - Takes Tylenol 4-5 times per week for pain    #severe obesity, class III  - counseled lifestyle modifications, including increasing weight loss efforts, dietary changes and exercise    #Gastroparesis  - Set to follow-up with gastroparesis clinic through [facility]    #Forearm/hand numbness  -Improvement in forearm numbness with B12 injections, received weekly x4, still had numbness in hands, will repeat B12 level     #Anxiety/Depression  - Increase Lexapro 10mg to 20mg daily  -Discussed switching to duloxetine to potentially help with chronic pain,  recalls being on it when she was younger and had suicidal thoughts and will avoid     RTC 3 months, sooner PRN       Jim Art, DO

## 2023-05-18 ENCOUNTER — PATIENT OUTREACH (OUTPATIENT)
Dept: CARE COORDINATION | Facility: CLINIC | Age: 28
End: 2023-05-18
Payer: COMMERCIAL

## 2023-05-18 ENCOUNTER — TELEPHONE (OUTPATIENT)
Dept: PRIMARY CARE | Facility: CLINIC | Age: 28
End: 2023-05-18
Payer: COMMERCIAL

## 2023-05-18 LAB — COBALAMIN (VITAMIN B12) (PG/ML) IN SER/PLAS: 331 PG/ML (ref 211–911)

## 2023-05-18 NOTE — PROGRESS NOTES
Call placed regarding one month post discharge follow up call.  At time of outreach call the patient feels as if their condition has improved since initial visit with PCP or specialist. Questions or concerns regarding recovery period addressed at this time. Reviewed any PCP or specialists progress notes/labs/radiology reports if applicable and addressed any questions or concerns.

## 2023-06-03 DIAGNOSIS — M54.50 BILATERAL LOW BACK PAIN WITHOUT SCIATICA, UNSPECIFIED CHRONICITY: ICD-10-CM

## 2023-06-05 RX ORDER — TIZANIDINE 4 MG/1
TABLET ORAL
Qty: 180 TABLET | Refills: 0 | Status: SHIPPED | OUTPATIENT
Start: 2023-06-05 | End: 2023-08-20

## 2023-06-10 DIAGNOSIS — I26.99 OTHER PULMONARY EMBOLISM WITHOUT ACUTE COR PULMONALE (MULTI): ICD-10-CM

## 2023-06-12 RX ORDER — WARFARIN SODIUM 5 MG/1
TABLET ORAL
Qty: 90 TABLET | Refills: 1 | OUTPATIENT
Start: 2023-06-12

## 2023-06-13 ENCOUNTER — LAB (OUTPATIENT)
Dept: LAB | Facility: LAB | Age: 28
End: 2023-06-13
Payer: COMMERCIAL

## 2023-06-13 ENCOUNTER — OFFICE VISIT (OUTPATIENT)
Dept: PRIMARY CARE | Facility: CLINIC | Age: 28
End: 2023-06-13
Payer: COMMERCIAL

## 2023-06-13 VITALS
DIASTOLIC BLOOD PRESSURE: 86 MMHG | BODY MASS INDEX: 39.08 KG/M2 | HEART RATE: 110 BPM | SYSTOLIC BLOOD PRESSURE: 122 MMHG | OXYGEN SATURATION: 98 % | HEIGHT: 67 IN | WEIGHT: 249 LBS

## 2023-06-13 DIAGNOSIS — K62.5 BRIGHT RED BLOOD PER RECTUM: ICD-10-CM

## 2023-06-13 DIAGNOSIS — I74.9 EMBOLISM (MULTI): ICD-10-CM

## 2023-06-13 DIAGNOSIS — K62.5 BRIGHT RED BLOOD PER RECTUM: Primary | ICD-10-CM

## 2023-06-13 DIAGNOSIS — F41.1 GENERALIZED ANXIETY DISORDER: ICD-10-CM

## 2023-06-13 DIAGNOSIS — Z00.00 HEALTH MAINTENANCE EXAMINATION: ICD-10-CM

## 2023-06-13 LAB
ERYTHROCYTE DISTRIBUTION WIDTH (RATIO) BY AUTOMATED COUNT: 13.8 % (ref 11.5–14.5)
ERYTHROCYTE MEAN CORPUSCULAR HEMOGLOBIN CONCENTRATION (G/DL) BY AUTOMATED: 32.1 G/DL (ref 32–36)
ERYTHROCYTE MEAN CORPUSCULAR VOLUME (FL) BY AUTOMATED COUNT: 92 FL (ref 80–100)
ERYTHROCYTES (10*6/UL) IN BLOOD BY AUTOMATED COUNT: 5.09 X10E12/L (ref 4–5.2)
HEMATOCRIT (%) IN BLOOD BY AUTOMATED COUNT: 47 % (ref 36–46)
HEMOGLOBIN (G/DL) IN BLOOD: 15.1 G/DL (ref 12–16)
LEUKOCYTES (10*3/UL) IN BLOOD BY AUTOMATED COUNT: 6 X10E9/L (ref 4.4–11.3)
NRBC (PER 100 WBCS) BY AUTOMATED COUNT: 0 /100 WBC (ref 0–0)
PLATELETS (10*3/UL) IN BLOOD AUTOMATED COUNT: 432 X10E9/L (ref 150–450)

## 2023-06-13 PROCEDURE — 3074F SYST BP LT 130 MM HG: CPT | Performed by: STUDENT IN AN ORGANIZED HEALTH CARE EDUCATION/TRAINING PROGRAM

## 2023-06-13 PROCEDURE — 85027 COMPLETE CBC AUTOMATED: CPT

## 2023-06-13 PROCEDURE — 3008F BODY MASS INDEX DOCD: CPT | Performed by: STUDENT IN AN ORGANIZED HEALTH CARE EDUCATION/TRAINING PROGRAM

## 2023-06-13 PROCEDURE — 99395 PREV VISIT EST AGE 18-39: CPT | Performed by: STUDENT IN AN ORGANIZED HEALTH CARE EDUCATION/TRAINING PROGRAM

## 2023-06-13 PROCEDURE — 36415 COLL VENOUS BLD VENIPUNCTURE: CPT

## 2023-06-13 PROCEDURE — 3079F DIAST BP 80-89 MM HG: CPT | Performed by: STUDENT IN AN ORGANIZED HEALTH CARE EDUCATION/TRAINING PROGRAM

## 2023-06-13 ASSESSMENT — ENCOUNTER SYMPTOMS
NEUROLOGICAL NEGATIVE: 1
RESPIRATORY NEGATIVE: 1
PSYCHIATRIC NEGATIVE: 1
CONSTITUTIONAL NEGATIVE: 1
CARDIOVASCULAR NEGATIVE: 1
GASTROINTESTINAL NEGATIVE: 1
MUSCULOSKELETAL NEGATIVE: 1

## 2023-06-13 NOTE — PROGRESS NOTES
"Subjective   Patient ID: Liliana Jay is a 28 y.o. female.    Patient seen on follow-up and exam.  Regards that she is getting an MRI done that has to be done under anesthesia and needs updated physical exam.  She states no issues with chest pain or no issues with activities.  Gets intermittent shortness of breath however this is chronic due to her history of pulmonary embolisms.  1 concern is that she does get bloody bowel movements with almost every bowel movement, bright red in nature, and seeing GI next week.  She regards no additional issues or issues with her medications.        Review of Systems   Constitutional: Negative.    HENT: Negative.     Respiratory: Negative.     Cardiovascular: Negative.    Gastrointestinal: Negative.    Musculoskeletal: Negative.    Neurological: Negative.    Psychiatric/Behavioral: Negative.         Objective   Visit Vitals  /86   Pulse 110   Ht 1.702 m (5' 7\")   Wt 113 kg (249 lb)   SpO2 98%   BMI 39.00 kg/m²   Smoking Status Some Days   BSA 2.31 m²      Physical Exam  Constitutional:       General: She is not in acute distress.     Appearance: She is not ill-appearing.   HENT:      Mouth/Throat:      Mouth: Mucous membranes are moist.      Pharynx: Oropharynx is clear. No oropharyngeal exudate or posterior oropharyngeal erythema.   Eyes:      Pupils: Pupils are equal, round, and reactive to light.   Cardiovascular:      Rate and Rhythm: Normal rate and regular rhythm.      Heart sounds: No murmur heard.  Pulmonary:      Effort: Pulmonary effort is normal. No respiratory distress.      Breath sounds: No wheezing.   Abdominal:      General: Abdomen is flat. Bowel sounds are normal. There is no distension.      Palpations: Abdomen is soft.      Tenderness: There is no abdominal tenderness.   Musculoskeletal:         General: No tenderness. Normal range of motion.   Skin:     General: Skin is warm and dry.   Neurological:      General: No focal deficit present.      " Mental Status: She is alert and oriented to person, place, and time. Mental status is at baseline.   Psychiatric:         Mood and Affect: Mood normal.         Assessment/Plan   Diagnoses and all orders for this visit:  Bright red blood per rectum  -     CBC; Future  Embolism (CMS/HCC)      #History of VTE  #Prothrombin gene disorder  - On lifetime anticoagulation, on Eliquis  - Following with Hematology at [facility]     #chronic neck pain  #chronic low back pain  - Did not get any relief from gabapentin  - Has been seen by Pain mgmt, not a good candidate for injection d/t AC use  - Has been on zanaflex for the chronic pain, will increase from BID to TID  - Takes Tylenol 4-5 times per week for pain  -Is getting the MRI connected, medically stable and cleared for MRI procedure    #Bright red blood per rectum  Suspected hemorrhoidal bleeding, recommend fiber diet, check CBC today as she is on anticoagulation encouraged follow-up with GI which she is already doing     #severe obesity, class III  - counseled lifestyle modifications, including increasing weight loss efforts, dietary changes and exercise     #Gastroparesis  - Set to follow-up with gastroparesis clinic through [facility]     #Forearm/hand numbness  -Improvement in forearm numbness with B12 injections, received weekly x4, still had numbness in hands, will repeat B12 level     #Anxiety/Depression  - Increase Lexapro 10mg to 20mg daily  -Discussed switching to duloxetine to potentially help with chronic pain, recalls being on it when she was younger and had suicidal thoughts and will avoid     RTC 3 months, sooner PRN

## 2023-07-14 RX ORDER — CYANOCOBALAMIN 1000 UG/ML
1000 INJECTION, SOLUTION INTRAMUSCULAR; SUBCUTANEOUS ONCE
Status: COMPLETED | OUTPATIENT
Start: 2023-07-14 | End: 2023-04-10

## 2023-08-18 DIAGNOSIS — M54.50 BILATERAL LOW BACK PAIN WITHOUT SCIATICA, UNSPECIFIED CHRONICITY: ICD-10-CM

## 2023-08-20 RX ORDER — TIZANIDINE 4 MG/1
TABLET ORAL
Qty: 60 TABLET | Refills: 0 | Status: SHIPPED | OUTPATIENT
Start: 2023-08-20 | End: 2023-08-21 | Stop reason: SDUPTHER

## 2023-08-21 DIAGNOSIS — M54.50 BILATERAL LOW BACK PAIN WITHOUT SCIATICA, UNSPECIFIED CHRONICITY: ICD-10-CM

## 2023-08-22 RX ORDER — TIZANIDINE 4 MG/1
TABLET ORAL
Qty: 60 TABLET | Refills: 0 | Status: SHIPPED | OUTPATIENT
Start: 2023-08-22 | End: 2023-09-21 | Stop reason: SDUPTHER

## 2023-08-23 ENCOUNTER — TELEPHONE (OUTPATIENT)
Dept: PRIMARY CARE | Facility: CLINIC | Age: 28
End: 2023-08-23
Payer: COMMERCIAL

## 2023-09-06 PROBLEM — F41.9 ANXIETY AND DEPRESSION: Status: ACTIVE | Noted: 2023-08-21

## 2023-09-06 PROBLEM — F32.A ANXIETY AND DEPRESSION: Status: ACTIVE | Noted: 2023-08-21

## 2023-09-20 ENCOUNTER — DOCUMENTATION (OUTPATIENT)
Dept: PRIMARY CARE | Facility: CLINIC | Age: 28
End: 2023-09-20
Payer: COMMERCIAL

## 2023-09-21 ENCOUNTER — PATIENT OUTREACH (OUTPATIENT)
Dept: CARE COORDINATION | Facility: CLINIC | Age: 28
End: 2023-09-21
Payer: COMMERCIAL

## 2023-09-21 ENCOUNTER — TELEPHONE (OUTPATIENT)
Dept: PRIMARY CARE | Facility: CLINIC | Age: 28
End: 2023-09-21
Payer: COMMERCIAL

## 2023-09-21 DIAGNOSIS — M54.50 BILATERAL LOW BACK PAIN WITHOUT SCIATICA, UNSPECIFIED CHRONICITY: ICD-10-CM

## 2023-09-21 RX ORDER — TIZANIDINE 4 MG/1
TABLET ORAL
Qty: 60 TABLET | Refills: 0 | Status: SHIPPED | OUTPATIENT
Start: 2023-09-21 | End: 2023-10-13

## 2023-09-21 RX ORDER — LEVETIRACETAM 750 MG/1
750 TABLET ORAL 2 TIMES DAILY
COMMUNITY
Start: 2023-09-19 | End: 2023-10-19

## 2023-09-21 NOTE — PROGRESS NOTES
Discharge Facility: [facility]  Discharge Diagnosis: seizures  Admission Date: 9/16/23  Discharge Date: 9/19/23    PCP Appointment Date: Pending  Specialist Appointment Date: 10/9/23 neurology  Hospital Encounter and Summary: Linked   See discharge assessment below for further details    Engagement  Call Start Time: 1255 (9/21/2023  1:08 PM)    Medications  Medications reviewed with patient/caregiver?: Yes (9/21/2023  1:08 PM)  Is the patient having any side effects they believe may be caused by any medication additions or changes?: No (9/21/2023  1:08 PM)  Does the patient have all medications ordered at discharge?: Yes (9/21/2023  1:08 PM)  Care Management Interventions: No intervention needed (9/21/2023  1:08 PM)  Prescription Comments: on keppra 750mg twice daily (9/21/2023  1:08 PM)  Is the patient taking all medications as directed (includes completed medication regime)?: Yes (9/21/2023  1:08 PM)    Appointments  Does the patient have a primary care provider?: Yes (9/21/2023  1:08 PM)  Care Management Interventions: Educated patient on importance of making appointment (9/21/2023  1:08 PM)  Has the patient kept scheduled appointments due by today?: Yes (9/21/2023  1:08 PM)  Care Management Interventions: Advised patient to keep appointment (9/21/2023  1:08 PM)    Patient Teaching  Does the patient have access to their discharge instructions?: Yes (9/21/2023  1:08 PM)  Care Management Interventions: Reviewed instructions with patient (9/21/2023  1:08 PM)  What is the patient's perception of their health status since discharge?: Returned to baseline/stable (9/21/2023  1:08 PM)  Is the patient/caregiver able to teach back the hierarchy of who to call/visit for symptoms/problems? PCP, Specialist, Home Health nurse, Urgent Care, ED, 911: Yes (9/21/2023  1:08 PM)  Patient/Caregiver Education Comments: Aware of seizure precautions like no driving, no operating machinery, no bathing/swimming alone (9/21/2023  1:08  PM)    Wrap Up  Wrap Up Additional Comments: Asking for refill on her zanaflex medication, will send message to Dr Vaz (9/21/2023  1:08 PM)  Call End Time: 1302 (9/21/2023  1:08 PM)

## 2023-10-05 ENCOUNTER — PATIENT OUTREACH (OUTPATIENT)
Dept: CARE COORDINATION | Facility: CLINIC | Age: 28
End: 2023-10-05
Payer: COMMERCIAL

## 2023-10-05 NOTE — PROGRESS NOTES
Unable to reach patient for call back after patient's hospital stay 2 week check in.  LVM with call back number for patient to call if needed

## 2023-10-13 DIAGNOSIS — M54.50 BILATERAL LOW BACK PAIN WITHOUT SCIATICA, UNSPECIFIED CHRONICITY: ICD-10-CM

## 2023-10-13 RX ORDER — TIZANIDINE 4 MG/1
TABLET ORAL
Qty: 180 TABLET | Refills: 0 | Status: SHIPPED | OUTPATIENT
Start: 2023-10-13 | End: 2023-10-24 | Stop reason: SDUPTHER

## 2023-10-22 DIAGNOSIS — F41.9 ANXIETY AND DEPRESSION: ICD-10-CM

## 2023-10-22 DIAGNOSIS — F32.A ANXIETY AND DEPRESSION: ICD-10-CM

## 2023-10-22 DIAGNOSIS — F41.9 ANXIETY: ICD-10-CM

## 2023-10-22 DIAGNOSIS — I26.99 PULMONARY EMBOLISM, UNSPECIFIED CHRONICITY, UNSPECIFIED PULMONARY EMBOLISM TYPE, UNSPECIFIED WHETHER ACUTE COR PULMONALE PRESENT (MULTI): ICD-10-CM

## 2023-10-23 RX ORDER — ESCITALOPRAM OXALATE 20 MG/1
20 TABLET ORAL DAILY
Qty: 30 TABLET | Refills: 0 | Status: SHIPPED | OUTPATIENT
Start: 2023-10-23 | End: 2023-11-21

## 2023-10-24 DIAGNOSIS — M54.50 BILATERAL LOW BACK PAIN WITHOUT SCIATICA, UNSPECIFIED CHRONICITY: ICD-10-CM

## 2023-10-25 RX ORDER — TIZANIDINE 4 MG/1
TABLET ORAL
Qty: 60 TABLET | Refills: 0 | Status: SHIPPED | OUTPATIENT
Start: 2023-10-25 | End: 2023-11-28 | Stop reason: SDUPTHER

## 2023-11-08 ENCOUNTER — PATIENT OUTREACH (OUTPATIENT)
Dept: CARE COORDINATION | Facility: CLINIC | Age: 28
End: 2023-11-08
Payer: COMMERCIAL

## 2023-11-16 DIAGNOSIS — F41.9 ANXIETY: ICD-10-CM

## 2023-11-16 DIAGNOSIS — F32.A ANXIETY AND DEPRESSION: ICD-10-CM

## 2023-11-16 DIAGNOSIS — F41.9 ANXIETY AND DEPRESSION: ICD-10-CM

## 2023-11-21 RX ORDER — ESCITALOPRAM OXALATE 20 MG/1
20 TABLET ORAL DAILY
Qty: 90 TABLET | Refills: 0 | Status: SHIPPED | OUTPATIENT
Start: 2023-11-21 | End: 2023-11-28 | Stop reason: SDUPTHER

## 2023-11-28 ENCOUNTER — APPOINTMENT (OUTPATIENT)
Dept: OBSTETRICS AND GYNECOLOGY | Facility: CLINIC | Age: 28
End: 2023-11-28
Payer: COMMERCIAL

## 2023-11-28 ENCOUNTER — OFFICE VISIT (OUTPATIENT)
Dept: PRIMARY CARE | Facility: CLINIC | Age: 28
End: 2023-11-28
Payer: COMMERCIAL

## 2023-11-28 VITALS
SYSTOLIC BLOOD PRESSURE: 134 MMHG | HEIGHT: 67 IN | BODY MASS INDEX: 34.84 KG/M2 | WEIGHT: 222 LBS | HEART RATE: 118 BPM | OXYGEN SATURATION: 97 % | DIASTOLIC BLOOD PRESSURE: 100 MMHG

## 2023-11-28 DIAGNOSIS — M54.50 BILATERAL LOW BACK PAIN WITHOUT SCIATICA, UNSPECIFIED CHRONICITY: ICD-10-CM

## 2023-11-28 DIAGNOSIS — I26.99 PULMONARY EMBOLISM, UNSPECIFIED CHRONICITY, UNSPECIFIED PULMONARY EMBOLISM TYPE, UNSPECIFIED WHETHER ACUTE COR PULMONALE PRESENT (MULTI): ICD-10-CM

## 2023-11-28 DIAGNOSIS — F41.9 ANXIETY: ICD-10-CM

## 2023-11-28 DIAGNOSIS — I74.9 EMBOLISM (MULTI): ICD-10-CM

## 2023-11-28 DIAGNOSIS — D68.52 PROTHROMBIN GENE MUTATION (MULTI): ICD-10-CM

## 2023-11-28 DIAGNOSIS — F32.A ANXIETY AND DEPRESSION: ICD-10-CM

## 2023-11-28 DIAGNOSIS — Z23 NEED FOR INFLUENZA VACCINATION: ICD-10-CM

## 2023-11-28 DIAGNOSIS — F41.9 ANXIETY AND DEPRESSION: ICD-10-CM

## 2023-11-28 PROCEDURE — 99214 OFFICE O/P EST MOD 30 MIN: CPT | Performed by: STUDENT IN AN ORGANIZED HEALTH CARE EDUCATION/TRAINING PROGRAM

## 2023-11-28 PROCEDURE — 3008F BODY MASS INDEX DOCD: CPT | Performed by: STUDENT IN AN ORGANIZED HEALTH CARE EDUCATION/TRAINING PROGRAM

## 2023-11-28 PROCEDURE — 3075F SYST BP GE 130 - 139MM HG: CPT | Performed by: STUDENT IN AN ORGANIZED HEALTH CARE EDUCATION/TRAINING PROGRAM

## 2023-11-28 PROCEDURE — 3080F DIAST BP >= 90 MM HG: CPT | Performed by: STUDENT IN AN ORGANIZED HEALTH CARE EDUCATION/TRAINING PROGRAM

## 2023-11-28 PROCEDURE — 90686 IIV4 VACC NO PRSV 0.5 ML IM: CPT | Performed by: STUDENT IN AN ORGANIZED HEALTH CARE EDUCATION/TRAINING PROGRAM

## 2023-11-28 PROCEDURE — 90471 IMMUNIZATION ADMIN: CPT | Performed by: STUDENT IN AN ORGANIZED HEALTH CARE EDUCATION/TRAINING PROGRAM

## 2023-11-28 RX ORDER — TIZANIDINE 4 MG/1
TABLET ORAL
Qty: 60 TABLET | Refills: 5 | Status: SHIPPED | OUTPATIENT
Start: 2023-11-28

## 2023-11-28 RX ORDER — ESCITALOPRAM OXALATE 20 MG/1
20 TABLET ORAL DAILY
Qty: 90 TABLET | Refills: 1 | Status: SHIPPED | OUTPATIENT
Start: 2023-11-28 | End: 2024-05-26
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
## 2024-01-17 ENCOUNTER — TELEPHONE (OUTPATIENT)
Dept: PRIMARY CARE | Facility: CLINIC | Age: 29
End: 2024-01-17
Payer: COMMERCIAL

## 2024-05-14 PROBLEM — E66.9 OBESITY, CLASS I, BMI 30-34.9: Status: ACTIVE | Noted: 2022-10-15

## 2024-05-14 PROBLEM — R56.9 SEIZURE-LIKE ACTIVITY (MULTI): Status: RESOLVED | Noted: 2023-09-16 | Resolved: 2024-05-14

## 2024-05-14 PROBLEM — I82.502 CHRONIC DEEP VEIN THROMBOSIS (DVT) OF LEFT LOWER EXTREMITY (MULTI): Status: RESOLVED | Noted: 2023-09-16 | Resolved: 2024-05-14

## 2024-05-14 PROBLEM — E66.811 OBESITY, CLASS I, BMI 30-34.9: Status: ACTIVE | Noted: 2022-10-15

## 2024-06-27 DIAGNOSIS — M54.50 BILATERAL LOW BACK PAIN WITHOUT SCIATICA, UNSPECIFIED CHRONICITY: ICD-10-CM

## 2024-06-27 DIAGNOSIS — F32.A ANXIETY AND DEPRESSION: ICD-10-CM

## 2024-06-27 DIAGNOSIS — F41.9 ANXIETY: ICD-10-CM

## 2024-06-27 DIAGNOSIS — F41.9 ANXIETY AND DEPRESSION: ICD-10-CM

## 2024-06-27 RX ORDER — TIZANIDINE 4 MG/1
TABLET ORAL
Qty: 60 TABLET | Refills: 0 | Status: SHIPPED | OUTPATIENT
Start: 2024-06-27

## 2024-06-27 RX ORDER — ESCITALOPRAM OXALATE 20 MG/1
20 TABLET ORAL DAILY
Qty: 30 TABLET | Refills: 0 | Status: SHIPPED | OUTPATIENT
Start: 2024-06-27 | End: 2024-12-24

## 2024-07-12 ENCOUNTER — APPOINTMENT (OUTPATIENT)
Dept: PRIMARY CARE | Facility: CLINIC | Age: 29
End: 2024-07-12
Payer: COMMERCIAL

## 2024-08-19 DIAGNOSIS — I26.99 PULMONARY EMBOLISM, UNSPECIFIED CHRONICITY, UNSPECIFIED PULMONARY EMBOLISM TYPE, UNSPECIFIED WHETHER ACUTE COR PULMONALE PRESENT (MULTI): ICD-10-CM

## 2024-08-20 DIAGNOSIS — I26.99 PULMONARY EMBOLISM, UNSPECIFIED CHRONICITY, UNSPECIFIED PULMONARY EMBOLISM TYPE, UNSPECIFIED WHETHER ACUTE COR PULMONALE PRESENT (MULTI): ICD-10-CM

## 2024-08-20 RX ORDER — APIXABAN 5 MG/1
5 TABLET, FILM COATED ORAL 2 TIMES DAILY
Qty: 60 TABLET | Refills: 0 | Status: SHIPPED | OUTPATIENT
Start: 2024-08-20

## 2024-09-16 DIAGNOSIS — M54.50 BILATERAL LOW BACK PAIN WITHOUT SCIATICA, UNSPECIFIED CHRONICITY: ICD-10-CM

## 2024-09-18 ENCOUNTER — APPOINTMENT (OUTPATIENT)
Dept: PRIMARY CARE | Facility: CLINIC | Age: 29
End: 2024-09-18
Payer: COMMERCIAL

## 2024-09-18 VITALS — BODY MASS INDEX: 31.79 KG/M2 | DIASTOLIC BLOOD PRESSURE: 82 MMHG | WEIGHT: 203 LBS | SYSTOLIC BLOOD PRESSURE: 108 MMHG

## 2024-09-18 DIAGNOSIS — I26.99 PULMONARY EMBOLISM, UNSPECIFIED CHRONICITY, UNSPECIFIED PULMONARY EMBOLISM TYPE, UNSPECIFIED WHETHER ACUTE COR PULMONALE PRESENT (MULTI): ICD-10-CM

## 2024-09-18 DIAGNOSIS — M54.50 BILATERAL LOW BACK PAIN WITHOUT SCIATICA, UNSPECIFIED CHRONICITY: ICD-10-CM

## 2024-09-18 DIAGNOSIS — F41.9 ANXIETY AND DEPRESSION: ICD-10-CM

## 2024-09-18 DIAGNOSIS — I74.9 EMBOLISM (MULTI): ICD-10-CM

## 2024-09-18 DIAGNOSIS — F41.9 ANXIETY: ICD-10-CM

## 2024-09-18 DIAGNOSIS — F32.A ANXIETY AND DEPRESSION: ICD-10-CM

## 2024-09-18 DIAGNOSIS — F31.9 BIPOLAR AFFECTIVE DISORDER, REMISSION STATUS UNSPECIFIED (MULTI): ICD-10-CM

## 2024-09-18 PROCEDURE — 3074F SYST BP LT 130 MM HG: CPT | Performed by: STUDENT IN AN ORGANIZED HEALTH CARE EDUCATION/TRAINING PROGRAM

## 2024-09-18 PROCEDURE — 99214 OFFICE O/P EST MOD 30 MIN: CPT | Performed by: STUDENT IN AN ORGANIZED HEALTH CARE EDUCATION/TRAINING PROGRAM

## 2024-09-18 PROCEDURE — 3079F DIAST BP 80-89 MM HG: CPT | Performed by: STUDENT IN AN ORGANIZED HEALTH CARE EDUCATION/TRAINING PROGRAM

## 2024-09-18 RX ORDER — TIZANIDINE 4 MG/1
TABLET ORAL
Qty: 180 TABLET | Refills: 1 | Status: SHIPPED | OUTPATIENT
Start: 2024-09-18

## 2024-09-18 RX ORDER — ESCITALOPRAM OXALATE 20 MG/1
20 TABLET ORAL DAILY
Qty: 30 TABLET | Refills: 0 | Status: CANCELLED | OUTPATIENT
Start: 2024-09-18 | End: 2025-03-17

## 2024-09-18 RX ORDER — SERTRALINE HYDROCHLORIDE 50 MG/1
50 TABLET, FILM COATED ORAL DAILY
Qty: 90 TABLET | Refills: 1 | Status: SHIPPED | OUTPATIENT
Start: 2024-09-18 | End: 2025-03-17

## 2024-09-18 RX ORDER — TIZANIDINE 4 MG/1
TABLET ORAL
Qty: 60 TABLET | Refills: 0 | OUTPATIENT
Start: 2024-09-18

## 2024-09-18 NOTE — PROGRESS NOTES
Follow up and med refill and anxiety issues    Subjective   Patient ID: Liliana Jay is a 29 y.o. female who presents for Follow-up, Med Refill, and anxiety issues.    HPI     Presents for follow-up and medication refill  Anxiety has been increased, nervous about leaving home  Was briefly out of lexapro, since restarting, does not think has been effective as past  Had been on celexa in the past  Had been on duloxetine in the past  Previously was taking lorazepam as needed that was stopped after mental health hospitalization     Review of Systems    8 point review of systems is otherwise negative unless mentioned on HPI      Objective   /82   Wt 92.1 kg (203 lb)   BMI 31.79 kg/m²     Physical Exam    General: No acute distress  HEENT: EOMI  CV: Regular rate and rhythm, normal S1 and S2, no murmurs  Pulm: Clear to auscultation bilaterally, no wheezings, rales or rhonchi  Abd: Nondistended  MSK: 5/5 strength in all extremities  Skin: No rashes   Lymphatic: No lymphadenopathy      Assessment/Plan         #History of VTE  #Prothrombin gene disorder  - On lifetime anticoagulation, on Eliquis  - Followed with Hematology at [facility]     #chronic neck pain  #chronic low back pain  - Did not get any relief from gabapentin  - Has been seen by Pain mgmt, not a good candidate for injection d/t AC use  - Has been on zanaflex for the chronic pain, continue  - Takes Tylenol 4-5 times per week for pain     #Seizure  -Follows with [facility] neurology, on keppra      #Gastroparesis  - Set to follow-up with gastroparesis clinic through [facility]     #Forearm/hand numbness  -Improvement in forearm numbness with B12 injections, received weekly x4, still had numbness in hands, will repeat B12 level     #Anxiety/Depression  -Was briefly out of lexapro, since restarting, does not think has been effective as past  Had been on celexa in the past  Had been on duloxetine in the past, had suicidal thoughts while on this medication  Previously was  taking lorazepam as needed that was stopped after mental health hospitalization   -Plan to transition from lexapro to zoloft      RTC 3-4 months     This note was dictated by speech recognition. Minor errors in transcription may be present.

## 2024-09-19 PROBLEM — F31.9 BIPOLAR AFFECTIVE DISORDER, REMISSION STATUS UNSPECIFIED (MULTI): Status: ACTIVE | Noted: 2023-02-18

## 2024-10-09 ENCOUNTER — TELEPHONE (OUTPATIENT)
Dept: PRIMARY CARE | Facility: CLINIC | Age: 29
End: 2024-10-09
Payer: COMMERCIAL

## 2024-10-09 NOTE — TELEPHONE ENCOUNTER
(Spoke with patient 4:36 p.m.) she Went to [facility] ED yesterday for flank pain and was told that she has a kidney stone in one kidney.  Only got pepcid from ED and she says she is in a lot of pain. I told her if the pain gets more severe to go back to ED.

## 2024-11-15 DIAGNOSIS — F41.9 ANXIETY: Primary | ICD-10-CM

## 2024-11-15 RX ORDER — SERTRALINE HYDROCHLORIDE 100 MG/1
100 TABLET, FILM COATED ORAL DAILY
Qty: 90 TABLET | Refills: 1 | Status: SHIPPED | OUTPATIENT
Start: 2024-11-15 | End: 2025-05-14

## 2024-11-15 RX ORDER — HYDROXYZINE HYDROCHLORIDE 25 MG/1
25 TABLET, FILM COATED ORAL 2 TIMES DAILY PRN
Qty: 60 TABLET | Refills: 1 | Status: SHIPPED | OUTPATIENT
Start: 2024-11-15 | End: 2025-01-14

## 2024-12-13 ENCOUNTER — APPOINTMENT (OUTPATIENT)
Dept: PRIMARY CARE | Facility: CLINIC | Age: 29
End: 2024-12-13
Payer: COMMERCIAL

## 2025-01-03 ENCOUNTER — APPOINTMENT (OUTPATIENT)
Dept: PRIMARY CARE | Facility: CLINIC | Age: 30
End: 2025-01-03
Payer: COMMERCIAL

## 2025-01-03 VITALS — BODY MASS INDEX: 30.85 KG/M2 | WEIGHT: 197 LBS | DIASTOLIC BLOOD PRESSURE: 78 MMHG | SYSTOLIC BLOOD PRESSURE: 92 MMHG

## 2025-01-03 DIAGNOSIS — G89.29 OTHER CHRONIC PAIN: Primary | ICD-10-CM

## 2025-01-03 DIAGNOSIS — D68.52 PROTHROMBIN GENE MUTATION (MULTI): ICD-10-CM

## 2025-01-03 DIAGNOSIS — F31.9 BIPOLAR AFFECTIVE DISORDER, REMISSION STATUS UNSPECIFIED (MULTI): ICD-10-CM

## 2025-01-03 DIAGNOSIS — Z00.00 HEALTH MAINTENANCE EXAMINATION: ICD-10-CM

## 2025-01-03 DIAGNOSIS — F41.9 ANXIETY: ICD-10-CM

## 2025-01-03 DIAGNOSIS — I26.99 PULMONARY EMBOLISM, UNSPECIFIED CHRONICITY, UNSPECIFIED PULMONARY EMBOLISM TYPE, UNSPECIFIED WHETHER ACUTE COR PULMONALE PRESENT (MULTI): ICD-10-CM

## 2025-01-03 DIAGNOSIS — I27.21 SECONDARY PULMONARY ARTERIAL HYPERTENSION (MULTI): ICD-10-CM

## 2025-01-03 DIAGNOSIS — I74.9 EMBOLISM (MULTI): ICD-10-CM

## 2025-01-03 DIAGNOSIS — M54.50 BILATERAL LOW BACK PAIN WITHOUT SCIATICA, UNSPECIFIED CHRONICITY: ICD-10-CM

## 2025-01-03 RX ORDER — TIZANIDINE 4 MG/1
TABLET ORAL
Qty: 180 TABLET | Refills: 1 | Status: SHIPPED | OUTPATIENT
Start: 2025-01-03

## 2025-01-03 RX ORDER — HYDROXYZINE HYDROCHLORIDE 25 MG/1
25 TABLET, FILM COATED ORAL 2 TIMES DAILY PRN
Qty: 60 TABLET | Refills: 3 | Status: SHIPPED | OUTPATIENT
Start: 2025-01-03 | End: 2025-05-03

## 2025-01-03 RX ORDER — SERTRALINE HYDROCHLORIDE 100 MG/1
100 TABLET, FILM COATED ORAL DAILY
Qty: 90 TABLET | Refills: 1 | Status: SHIPPED | OUTPATIENT
Start: 2025-01-03 | End: 2025-07-02

## 2025-01-03 RX ORDER — SENNOSIDES 8.6 MG
1 TABLET ORAL 2 TIMES DAILY
Qty: 180 TABLET | Refills: 1 | Status: SHIPPED | OUTPATIENT
Start: 2025-01-03

## 2025-01-03 NOTE — PROGRESS NOTES
Subjective   Patient ID: Liliana Jay is a 29 y.o. female who presents for Follow-up, Med Refill, and Flu Vaccine.    HPI     Presents for follow-up, medication refill, flu vaccine.  Had noted some increase in chronic pain with similar changes in temperature, have been most noticeable in her lower back.  Has felt that has been a positive transition from Lexapro to Zoloft    Review of Systems    8 point review of systems is otherwise negative unless mentioned on HPI      Objective   BP 92/78   Wt 89.4 kg (197 lb)   BMI 30.85 kg/m²     Physical Exam    General: No acute distress  HEENT: EOMI  CV: Regular rate and rhythm, normal S1 and S2, no murmurs  Pulm: Clear to auscultation bilaterally, no wheezings, rales or rhonchi  Abd: Nondistended  MSK: 5/5 strength in all extremities  Skin: No rashes   Lymphatic: No lymphadenopathy      Assessment/Plan       #History of VTE  #Prothrombin gene disorder  - On lifetime anticoagulation, on Eliquis  - Followed with Hematology at [facility]     #chronic neck pain  #chronic low back pain  - Did not get any relief from gabapentin  - Has been seen by Pain mgmt, not a good candidate for injection d/t AC use  - Has been on zanaflex for the chronic pain, continue  - Takes Tylenol 4-5 times per week for pain  - Magnesium supplementation     #Seizure  -Follows with [facility] neurology, on keppra      #Gastroparesis  -Had plan to follow-up with gastroparesis through [facility]       #Forearm/hand numbness  -Improvement in forearm numbness with B12 injections, received weekly x4     #Anxiety/Depression  -Previously had taken Lexapro, when had restarted did not think it had been effective as before was since transitioned to Zoloft  -Was briefly out of lexapro, since restarting, does not think has been effective as past  -Had been on celexa in the past  -Had been on duloxetine in the past, had suicidal thoughts while on this medication  -Previously was taking lorazepam as needed that was  stopped after mental health hospitalization      RTC 6 months     This note was dictated by speech recognition. Minor errors in transcription may be present.

## 2025-01-30 ENCOUNTER — APPOINTMENT (OUTPATIENT)
Dept: CARDIOLOGY | Facility: HOSPITAL | Age: 30
End: 2025-01-30
Payer: COMMERCIAL

## 2025-01-30 ENCOUNTER — HOSPITAL ENCOUNTER (EMERGENCY)
Facility: HOSPITAL | Age: 30
Discharge: HOME | End: 2025-01-30
Attending: STUDENT IN AN ORGANIZED HEALTH CARE EDUCATION/TRAINING PROGRAM
Payer: COMMERCIAL

## 2025-01-30 VITALS
TEMPERATURE: 98.1 F | HEART RATE: 100 BPM | SYSTOLIC BLOOD PRESSURE: 120 MMHG | RESPIRATION RATE: 20 BRPM | DIASTOLIC BLOOD PRESSURE: 85 MMHG | OXYGEN SATURATION: 95 %

## 2025-01-30 DIAGNOSIS — F10.920 ALCOHOLIC INTOXICATION WITHOUT COMPLICATION (CMS-HCC): Primary | ICD-10-CM

## 2025-01-30 LAB
AMPHETAMINES UR QL SCN: ABNORMAL
APPEARANCE UR: CLEAR
B-HCG SERPL-ACNC: <2 MIU/ML
BARBITURATES UR QL SCN: ABNORMAL
BASOPHILS # BLD AUTO: 0.05 X10*3/UL (ref 0–0.1)
BASOPHILS NFR BLD AUTO: 0.6 %
BENZODIAZ UR QL SCN: ABNORMAL
BILIRUB UR STRIP.AUTO-MCNC: NEGATIVE MG/DL
BZE UR QL SCN: ABNORMAL
CANNABINOIDS UR QL SCN: ABNORMAL
COLOR UR: NORMAL
EOSINOPHIL # BLD AUTO: 0.02 X10*3/UL (ref 0–0.7)
EOSINOPHIL NFR BLD AUTO: 0.3 %
ERYTHROCYTE [DISTWIDTH] IN BLOOD BY AUTOMATED COUNT: 13 % (ref 11.5–14.5)
FENTANYL+NORFENTANYL UR QL SCN: ABNORMAL
GLUCOSE UR STRIP.AUTO-MCNC: NORMAL MG/DL
HCT VFR BLD AUTO: 46.2 % (ref 36–46)
HGB BLD-MCNC: 16.2 G/DL (ref 12–16)
HOLD SPECIMEN: NORMAL
IMM GRANULOCYTES # BLD AUTO: 0.02 X10*3/UL (ref 0–0.7)
IMM GRANULOCYTES NFR BLD AUTO: 0.3 % (ref 0–0.9)
KETONES UR STRIP.AUTO-MCNC: NEGATIVE MG/DL
LEUKOCYTE ESTERASE UR QL STRIP.AUTO: NEGATIVE
LYMPHOCYTES # BLD AUTO: 1.82 X10*3/UL (ref 1.2–4.8)
LYMPHOCYTES NFR BLD AUTO: 23.5 %
MCH RBC QN AUTO: 33.7 PG (ref 26–34)
MCHC RBC AUTO-ENTMCNC: 35.1 G/DL (ref 32–36)
MCV RBC AUTO: 96 FL (ref 80–100)
METHADONE UR QL SCN: ABNORMAL
MONOCYTES # BLD AUTO: 0.45 X10*3/UL (ref 0.1–1)
MONOCYTES NFR BLD AUTO: 5.8 %
NEUTROPHILS # BLD AUTO: 5.38 X10*3/UL (ref 1.2–7.7)
NEUTROPHILS NFR BLD AUTO: 69.5 %
NITRITE UR QL STRIP.AUTO: NEGATIVE
NRBC BLD-RTO: 0 /100 WBCS (ref 0–0)
OPIATES UR QL SCN: ABNORMAL
OXYCODONE+OXYMORPHONE UR QL SCN: ABNORMAL
PCP UR QL SCN: ABNORMAL
PH UR STRIP.AUTO: 5.5 [PH]
PLATELET # BLD AUTO: 420 X10*3/UL (ref 150–450)
PROT UR STRIP.AUTO-MCNC: NEGATIVE MG/DL
RBC # BLD AUTO: 4.81 X10*6/UL (ref 4–5.2)
RBC # UR STRIP.AUTO: NEGATIVE /UL
SP GR UR STRIP.AUTO: 1.01
UROBILINOGEN UR STRIP.AUTO-MCNC: NORMAL MG/DL
WBC # BLD AUTO: 7.7 X10*3/UL (ref 4.4–11.3)

## 2025-01-30 PROCEDURE — 84702 CHORIONIC GONADOTROPIN TEST: CPT | Performed by: STUDENT IN AN ORGANIZED HEALTH CARE EDUCATION/TRAINING PROGRAM

## 2025-01-30 PROCEDURE — 80307 DRUG TEST PRSMV CHEM ANLYZR: CPT | Performed by: STUDENT IN AN ORGANIZED HEALTH CARE EDUCATION/TRAINING PROGRAM

## 2025-01-30 PROCEDURE — 2500000004 HC RX 250 GENERAL PHARMACY W/ HCPCS (ALT 636 FOR OP/ED)

## 2025-01-30 PROCEDURE — 36415 COLL VENOUS BLD VENIPUNCTURE: CPT | Performed by: STUDENT IN AN ORGANIZED HEALTH CARE EDUCATION/TRAINING PROGRAM

## 2025-01-30 PROCEDURE — 96374 THER/PROPH/DIAG INJ IV PUSH: CPT

## 2025-01-30 PROCEDURE — 93005 ELECTROCARDIOGRAM TRACING: CPT

## 2025-01-30 PROCEDURE — 2500000004 HC RX 250 GENERAL PHARMACY W/ HCPCS (ALT 636 FOR OP/ED): Performed by: STUDENT IN AN ORGANIZED HEALTH CARE EDUCATION/TRAINING PROGRAM

## 2025-01-30 PROCEDURE — 96375 TX/PRO/DX INJ NEW DRUG ADDON: CPT

## 2025-01-30 PROCEDURE — 99285 EMERGENCY DEPT VISIT HI MDM: CPT | Mod: 25 | Performed by: STUDENT IN AN ORGANIZED HEALTH CARE EDUCATION/TRAINING PROGRAM

## 2025-01-30 PROCEDURE — 81003 URINALYSIS AUTO W/O SCOPE: CPT | Performed by: STUDENT IN AN ORGANIZED HEALTH CARE EDUCATION/TRAINING PROGRAM

## 2025-01-30 PROCEDURE — 85025 COMPLETE CBC W/AUTO DIFF WBC: CPT | Performed by: STUDENT IN AN ORGANIZED HEALTH CARE EDUCATION/TRAINING PROGRAM

## 2025-01-30 PROCEDURE — 2500000001 HC RX 250 WO HCPCS SELF ADMINISTERED DRUGS (ALT 637 FOR MEDICARE OP): Performed by: STUDENT IN AN ORGANIZED HEALTH CARE EDUCATION/TRAINING PROGRAM

## 2025-01-30 RX ORDER — LORAZEPAM 2 MG/1
2 TABLET ORAL ONCE
Status: DISCONTINUED | OUTPATIENT
Start: 2025-01-30 | End: 2025-01-30 | Stop reason: HOSPADM

## 2025-01-30 RX ORDER — LORAZEPAM 2 MG/ML
1 INJECTION INTRAMUSCULAR ONCE
Status: COMPLETED | OUTPATIENT
Start: 2025-01-30 | End: 2025-01-30

## 2025-01-30 RX ORDER — ONDANSETRON HYDROCHLORIDE 2 MG/ML
4 INJECTION, SOLUTION INTRAVENOUS ONCE
Status: COMPLETED | OUTPATIENT
Start: 2025-01-30 | End: 2025-01-30

## 2025-01-30 RX ORDER — ZIPRASIDONE MESYLATE 20 MG/ML
20 INJECTION, POWDER, LYOPHILIZED, FOR SOLUTION INTRAMUSCULAR ONCE
Status: COMPLETED | OUTPATIENT
Start: 2025-01-30 | End: 2025-01-30

## 2025-01-30 RX ORDER — ZIPRASIDONE MESYLATE 20 MG/ML
INJECTION, POWDER, LYOPHILIZED, FOR SOLUTION INTRAMUSCULAR
Status: COMPLETED
Start: 2025-01-30 | End: 2025-01-30

## 2025-01-30 RX ADMIN — ONDANSETRON 4 MG: 2 INJECTION INTRAMUSCULAR; INTRAVENOUS at 06:55

## 2025-01-30 RX ADMIN — LORAZEPAM 1 MG: 2 INJECTION INTRAMUSCULAR; INTRAVENOUS at 06:56

## 2025-01-30 RX ADMIN — ZIPRASIDONE MESYLATE 20 MG: 20 INJECTION, POWDER, LYOPHILIZED, FOR SOLUTION INTRAMUSCULAR at 08:27

## 2025-01-30 SDOH — HEALTH STABILITY: MENTAL HEALTH: FOR HIGH RISK PATIENTS: 1:1 PATIENT OBSERVER AT ALL TIMES;ALL INTERVENTIONS ABOVE, PLUS:

## 2025-01-30 SDOH — HEALTH STABILITY: MENTAL HEALTH: BEHAVIORAL HEALTH(WDL): EXCEPTIONS TO WDL

## 2025-01-30 SDOH — HEALTH STABILITY: MENTAL HEALTH
OTHER SUICIDE PRECAUTIONS INCLUDE: PATIENT PLACED IN AN EASILY OBSERVABLE ROOM WITH DOOR/CURTAIN REMAINING OPEN;REMAINING RISKS IDENTIFIED AND MITIGATED;PATIENT PLACED IN GOWN (SNAPS OR PAPER GOWNS PREFERRED) AND WANDED;PATIENT PLACED IN PSYCH SAFE ROOM (IF AVAILABLE)

## 2025-01-30 SDOH — HEALTH STABILITY: MENTAL HEALTH: DEPRESSION SYMPTOMS: SLEEP DISTURBANCE;FEELINGS OF HELPLESSNESS

## 2025-01-30 SDOH — HEALTH STABILITY: MENTAL HEALTH: BEHAVIORS/MOOD: AGITATED;ANXIOUS;ANGRY;COMBATIVE

## 2025-01-30 SDOH — HEALTH STABILITY: MENTAL HEALTH: ANXIETY SYMPTOMS: GENERALIZED

## 2025-01-30 SDOH — HEALTH STABILITY: MENTAL HEALTH: IN THE PAST WEEK, HAVE YOU BEEN HAVING THOUGHTS ABOUT KILLING YOURSELF?: NO

## 2025-01-30 SDOH — HEALTH STABILITY: MENTAL HEALTH: FOR HIGH RISK PATIENTS: ALL INTERVENTIONS ABOVE, PLUS:;1:1 PATIENT OBSERVER AT ALL TIMES

## 2025-01-30 SDOH — HEALTH STABILITY: MENTAL HEALTH
BEHAVIORS/MOOD: APPEARS INTOXICATED;AFFECT INCONSISTENT WITH MOOD;AGGRESSIVE PHYSICALLY, SELF;PACING;LABILE;TEARFUL;RESTLESS

## 2025-01-30 SDOH — HEALTH STABILITY: MENTAL HEALTH: CONTENT: BLAMING SELF

## 2025-01-30 SDOH — HEALTH STABILITY: MENTAL HEALTH
OTHER SUICIDE PRECAUTIONS INCLUDE: PATIENT PLACED IN GOWN (SNAPS OR PAPER GOWNS PREFERRED) AND WANDED;PATIENT PLACED IN AN EASILY OBSERVABLE ROOM WITH DOOR/CURTAIN REMAINING OPEN;REMAINING RISKS IDENTIFIED AND MITIGATED;PATIENT PLACED IN PSYCH SAFE ROOM (IF AVAILABLE);PROVIDER NOTIFIED;ELOPEMENT RISK IDENTIFIED;FAMILY/VISITOR ADVISED TO MAINTAIN CONTROL OF OWN PERSONAL BELONGINGS IN ROOM;FREQUENT ROUNDING WITH IRREGULAR CHECKS AT MINIMUM OF EVERY 15 MINUTES TO ASSESS PSYCH SAFETY PERFORMED;HOME MEDICATION LIST COLLECTED AND SHARED WITH PROVIDER;PERSONAL BELONGINGS SECURED;TREATMENT PLAN BASED ON RISK FACTORS DEVELOPED (ED ONLY - IF PATIENT IN ED MORE THAN 8 HOURS);HOURLY BEHAVIORAL ASSESSMENT PERFORMED;VISITORS LIMITED WHEN NECESSARY AND PERSONAL ITEMS SCREENED

## 2025-01-30 SDOH — HEALTH STABILITY: MENTAL HEALTH: IN THE PAST FEW WEEKS, HAVE YOU FELT THAT YOU OR YOUR FAMILY WOULD BE BETTER OFF IF YOU WERE DEAD?: NO

## 2025-01-30 SDOH — HEALTH STABILITY: MENTAL HEALTH
OTHER SUICIDE PRECAUTIONS INCLUDE: PATIENT PLACED IN AN EASILY OBSERVABLE ROOM WITH DOOR/CURTAIN REMAINING OPEN;PATIENT PLACED IN GOWN (SNAPS OR PAPER GOWNS PREFERRED) AND WANDED;REMAINING RISKS IDENTIFIED AND MITIGATED;PATIENT PLACED IN PSYCH SAFE ROOM (IF AVAILABLE)

## 2025-01-30 SDOH — HEALTH STABILITY: MENTAL HEALTH
OTHER SUICIDE PRECAUTIONS INCLUDE: PATIENT PLACED IN AN EASILY OBSERVABLE ROOM WITH DOOR/CURTAIN REMAINING OPEN;PATIENT PLACED IN GOWN (SNAPS OR PAPER GOWNS PREFERRED) AND WANDED;ELOPEMENT RISK IDENTIFIED;PROVIDER NOTIFIED;HOME MEDICATION LIST COLLECTED AND SHARED WITH PROVIDER

## 2025-01-30 SDOH — ECONOMIC STABILITY: HOUSING INSECURITY: FEELS SAFE LIVING IN HOME: YES

## 2025-01-30 SDOH — HEALTH STABILITY: MENTAL HEALTH: IN THE PAST FEW WEEKS, HAVE YOU WISHED YOU WERE DEAD?: NO

## 2025-01-30 SDOH — HEALTH STABILITY: MENTAL HEALTH: DELUSIONS: OTHER (COMMENT)

## 2025-01-30 SDOH — HEALTH STABILITY: MENTAL HEALTH
OTHER SUICIDE PRECAUTIONS INCLUDE: PATIENT PLACED IN AN EASILY OBSERVABLE ROOM WITH DOOR/CURTAIN REMAINING OPEN;PATIENT PLACED IN GOWN (SNAPS OR PAPER GOWNS PREFERRED) AND WANDED;REMAINING RISKS IDENTIFIED AND MITIGATED;PATIENT PLACED IN PSYCH SAFE ROOM (IF AVAILABLE);PROVIDER NOTIFIED;FAMILY/VISITOR ADVISED TO MAINTAIN CONTROL OF OWN PERSONAL BELONGINGS IN ROOM;ELOPEMENT RISK IDENTIFIED;FREQUENT ROUNDING WITH IRREGULAR CHECKS AT MINIMUM OF EVERY 15 MINUTES TO ASSESS PSYCH SAFETY PERFORMED;PERSONAL BELONGINGS SECURED;TREATMENT PLAN BASED ON RISK FACTORS DEVELOPED (ED ONLY - IF PATIENT IN ED MORE THAN 8 HOURS)

## 2025-01-30 SDOH — HEALTH STABILITY: MENTAL HEALTH: SLEEP PATTERN: RESTLESSNESS

## 2025-01-30 SDOH — HEALTH STABILITY: MENTAL HEALTH: HAVE YOU EVER TRIED TO KILL YOURSELF?: NO

## 2025-01-30 SDOH — HEALTH STABILITY: MENTAL HEALTH: SUICIDAL BEHAVIOR (LIFETIME): NO

## 2025-01-30 SDOH — HEALTH STABILITY: MENTAL HEALTH
OTHER SUICIDE PRECAUTIONS INCLUDE: PATIENT PLACED IN AN EASILY OBSERVABLE ROOM WITH DOOR/CURTAIN REMAINING OPEN;REMAINING RISKS IDENTIFIED AND MITIGATED;PATIENT PLACED IN PSYCH SAFE ROOM (IF AVAILABLE);PATIENT PLACED IN GOWN (SNAPS OR PAPER GOWNS PREFERRED) AND WANDED;ELOPEMENT RISK IDENTIFIED;PROVIDER NOTIFIED;FREQUENT ROUNDING WITH IRREGULAR CHECKS AT MINIMUM OF EVERY 15 MINUTES TO ASSESS PSYCH SAFETY PERFORMED;FAMILY/VISITOR ADVISED TO MAINTAIN CONTROL OF OWN PERSONAL BELONGINGS IN ROOM;PERSONAL BELONGINGS SECURED;HOURLY BEHAVIORAL ASSESSMENT PERFORMED;HOME MEDICATION LIST COLLECTED AND SHARED WITH PROVIDER

## 2025-01-30 SDOH — HEALTH STABILITY: MENTAL HEALTH: HALLUCINATION: OTHER (COMMENT)

## 2025-01-30 SDOH — HEALTH STABILITY: MENTAL HEALTH: ARE YOU HAVING THOUGHTS OF KILLING YOURSELF RIGHT NOW?: NO

## 2025-01-30 SDOH — HEALTH STABILITY: MENTAL HEALTH: WISH TO BE DEAD (PAST 1 MONTH): NO

## 2025-01-30 SDOH — HEALTH STABILITY: MENTAL HEALTH
OTHER SUICIDE PRECAUTIONS INCLUDE: PATIENT PLACED IN AN EASILY OBSERVABLE ROOM WITH DOOR/CURTAIN REMAINING OPEN;PATIENT PLACED IN GOWN (SNAPS OR PAPER GOWNS PREFERRED) AND WANDED;REMAINING RISKS IDENTIFIED AND MITIGATED;PATIENT PLACED IN PSYCH SAFE ROOM (IF AVAILABLE);PROVIDER NOTIFIED;ELOPEMENT RISK IDENTIFIED;FAMILY/VISITOR ADVISED TO MAINTAIN CONTROL OF OWN PERSONAL BELONGINGS IN ROOM;FREQUENT ROUNDING WITH IRREGULAR CHECKS AT MINIMUM OF EVERY 15 MINUTES TO ASSESS PSYCH SAFETY PERFORMED

## 2025-01-30 SDOH — HEALTH STABILITY: MENTAL HEALTH
OTHER SUICIDE PRECAUTIONS INCLUDE: PATIENT PLACED IN AN EASILY OBSERVABLE ROOM WITH DOOR/CURTAIN REMAINING OPEN;PATIENT PLACED IN GOWN (SNAPS OR PAPER GOWNS PREFERRED) AND WANDED;REMAINING RISKS IDENTIFIED AND MITIGATED;PATIENT PLACED IN PSYCH SAFE ROOM (IF AVAILABLE);PROVIDER NOTIFIED;ELOPEMENT RISK IDENTIFIED

## 2025-01-30 SDOH — HEALTH STABILITY: MENTAL HEALTH: NON-SPECIFIC ACTIVE SUICIDAL THOUGHTS (PAST 1 MONTH): NO

## 2025-01-30 ASSESSMENT — ENCOUNTER SYMPTOMS
VOMITING: 1
NAUSEA: 1
CHILLS: 0

## 2025-01-30 ASSESSMENT — LIFESTYLE VARIABLES
PRESCIPTION_ABUSE_PAST_12_MONTHS: YES
SUBSTANCE_ABUSE_PAST_12_MONTHS: YES
HAVE PEOPLE ANNOYED YOU BY CRITICIZING YOUR DRINKING: NO
EVER FELT BAD OR GUILTY ABOUT YOUR DRINKING: NO
HAVE YOU EVER FELT YOU SHOULD CUT DOWN ON YOUR DRINKING: NO
EVER HAD A DRINK FIRST THING IN THE MORNING TO STEADY YOUR NERVES TO GET RID OF A HANGOVER: NO
TOTAL SCORE: 0

## 2025-01-30 ASSESSMENT — PAIN - FUNCTIONAL ASSESSMENT
PAIN_FUNCTIONAL_ASSESSMENT: 0-10
PAIN_FUNCTIONAL_ASSESSMENT: 0-10

## 2025-01-30 NOTE — ED PROVIDER NOTES
HPI   Chief Complaint   Patient presents with    Psychiatric Evaluation     Patient brought in by police; patient found wandering around and admits to drug use       HPI    Patient called police on herself and was brought in for psychiatric evaluation.  States that she has not been feeling well for quite some time and has a lot going on.  Has had increased anxiety.  Takes hydroxyzine as well as Zoloft.  States she was started on Zoloft about 6 months ago.  States that she did use cocaine, amphetamines and alcohol yesterday.  Began having thoughts of self-harm and suicide.  Denies any intentional ingestion or overdose.      Patient History   Past Medical History:   Diagnosis Date    Acute vaginitis 2020    BV (bacterial vaginosis)    Anxiety disorder, unspecified 10/05/2015    Anxiety    Chronic deep vein thrombosis (DVT) of left lower extremity (Multi) 2023    Personal history of other diseases of the female genital tract 2018    History of irregular menstrual cycles    Seizure-like activity (Multi) 2023     Past Surgical History:   Procedure Laterality Date    ANKLE ARTHROSCOPY W/ OPEN REPAIR  10/31/2017    Ankle Repair     SECTION, CLASSIC  10/05/2015     Section    GALLBLADDER SURGERY  10/05/2015    Gallbladder Surgery    MR HEAD ANGIO WO IV CONTRAST  2019    MR HEAD ANGIO WO IV CONTRAST 2019 [facility]    MR NECK ANGIO WO IV CONTRAST  2019    MR NECK ANGIO WO IV CONTRAST 2019 [facility]    OTHER SURGICAL HISTORY  2020    Shoulder surgery     Family History   Problem Relation Name Age of Onset    Other (Coagulation disorder) Mother      Other (Coagulation disorder) Father      Graves' disease Father      Other (Parkinson's disease) Father      Diabetes Maternal Grandmother      Other (Coagulation disorder) Other Mat. rel.     Diabetes Other Mat. rel.     Epilepsy Other Mat. rel.     Other (Parkinson's disease) Other Mat. rel.       Social History     Tobacco Use    Smoking status: Some Days     Types: Cigarettes    Smokeless tobacco: Current    Tobacco comments:     vaping   Vaping Use    Vaping status: Every Day   Substance Use Topics    Alcohol use: Yes     Comment: social    Drug use: Never     Review of Systems   Constitutional:  Negative for chills.   Gastrointestinal:  Positive for nausea and vomiting.   Psychiatric/Behavioral:  Positive for suicidal ideas.          Physical Exam   ED Triage Vitals [01/30/25 0607]   Temperature Heart Rate Respirations BP   36.2 °C (97.2 °F) 92 (!) 22 114/79      Pulse Ox Temp src Heart Rate Source Patient Position   94 % -- -- --      BP Location FiO2 (%)     -- --       Physical Exam  Vitals and nursing note reviewed.   Cardiovascular:      Rate and Rhythm: Normal rate.   Pulmonary:      Effort: Pulmonary effort is normal.   Abdominal:      Palpations: Abdomen is soft.      Tenderness: There is no guarding or rebound.   Neurological:      Mental Status: She is alert.   Psychiatric:         Mood and Affect: Mood is anxious. Affect is tearful.         Behavior: Behavior is agitated and hyperactive.         Thought Content: Thought content includes suicidal ideation.           ED Course & MDM   Diagnoses as of 01/30/25 2130   Alcoholic intoxication without complication (CMS-HCC)                 No data recorded                                 Medical Decision Making  Patient is awake alert and oriented.  Upset, agitated.  Here with suicidal ideation and increased anxiety.  Also having some nausea vomiting.  Lab work was done.  Plan for EPAT evaluation.    Procedure  ECG 12 lead    Performed by: Mere Blunt MD  Authorized by: Mere Blunt MD    ECG interpreted by ED Physician in the absence of a cardiologist: yes    Comments:      Normal sinus rhythm  Rate of 84  MO interval 170  QRS duration 78    No ST segment elevation or depression       Mere Blunt MD  01/30/25 0744       Mere  MD Merlene  01/30/25 3190     Home

## 2025-01-30 NOTE — ED PROVIDER NOTES
Patient is now awake and alert.  She was evaluated by EP AT.  She is awake and alert and speaking with them appropriately.  Denies suicidal thoughts or ideation.  They feel that she is safe for discharge.  She is comfortable going home.  She has a safe discharge plan.  She states that she knows what to do if things get worse.  Not interested in detox at this time.     Janak Villatoro MD  01/30/25 4647

## 2025-01-30 NOTE — PROGRESS NOTES
EPAT - Social Work Psychiatric Assessment    Arrival Details  Mode of Arrival: Ambulatory  Admission Source: Home  Admission Type: Voluntary  EPAT Assessment Start Date: 01/30/25  EPAT Assessment Start Time: 1454  Name of : Pravin Marcus    History of Present Illness  Admission Reason: Polysubstance Use, Hallucinations.  HPI: Patient is a 29 year old female who presented to the ED via police. She was found in the street and was agitated. The patient reports using Cocaine and Alcohol. She reports having hallucinations. A review of [facility] Triage, Provider and [place] was conducted.     Readmission Information   Readmission within 30 Days: No    Psychiatric Symptoms  Anxiety Symptoms: Generalized  Depression Symptoms: Sleep disturbance, Feelings of helplessness  Claudia Symptoms: No problems reported or observed.    Psychosis Symptoms  Hallucination Type: No problems reported or observed.  Delusion Type: No problems reported or observed.    Additional Symptoms - Adult  Generalized Anxiety Disorder: Excessive anxiety/worry  Obsessive Compulsive Disorder: No problems reported or observed.  Panic Attack: No problems reported or observed.  Post Traumatic Stress Disorder: No problems reported or observed.  Delirium: No problems reported or observed.  Review of Symptoms Comments: Please see above    Past Psychiatric History/Meds/Treatments  Past Psychiatric History: Patient currently sees her PCP at  in [place] for her medications for mental health. She reports she uses the medications as prescribed. She denies any history of self harm or substance treatment.  Past Psychiatric Meds/Treatments: See med list: Patient is compliant.  Past Violence/Victimization History: hx of aggression    Current Mental Health Contacts   Name/Phone Number: none   Last Appointment Date: none  Provider Name/Phone Number: /PCP  Provider Last Appointment Date: unknown    Support System: Immediate  family    Living Arrangement: House    Home Safety  Feels Safe Living in Home: Yes         [place] Service/Education History  Current or Previous  Service: None  Education Level: College  History of Learning Problems: No  History of School Behavior Problems: No  School History: some college    Social/Cultural History  Social History: Patient is her own guardian.  Important Activities: Social    Legal  Legal Concerns: none    Drug Screening  Have you used any substances (canabis, cocaine, heroin, hallucinogens, inhalants, etc.) in the past 12 months?: Yes  Have you used any prescription drugs other than prescribed in the past 12 months?: Yes  Is a toxicology screen needed?: Yes    Stage of Change  Stage of Change: Precontemplation  History of Treatment: Other (Comment) (none)  Type of Treatment Offered: Inpatient, IOP, Individual, AA/NA meeting resource  Treatment Offered: Declined  Duration of Substance Use: occasional substance use.  Frequency of Substance Use: n/a  Age of First Substance Use: n/a    Behavioral Health  Behavioral Health(WDL): Exceptions to WDL  Behaviors/Mood: Anxious, Cooperative  Affect: Inconsistent with mood  Parent/Guardian/Significant Other Involvement: No involvement  Family Behaviors: Anxious, Cooperative  Visitor Behaviors: Appropriate for situation  Needs Expressed: Other (Comment)    Orientation  Orientation Level: Oriented X4    General Appearance  Motor Activity: Unremarkable  Speech Pattern: Other (Comment)  General Attitude: Cooperative  Appearance/Hygiene: Disheveled    Thought Process  Coherency: Unable to assess  Content: Unremarkable  Delusions: Other (Comment)  Perception: Not altered  Hallucination: None  Judgment/Insight: Limited  Confusion: None  Cognition: Appropriate judgement, Appropriate safety awareness, Appropriate attention/concentration, Appropriate for developmental age, Follows commands    Sleep Pattern  Sleep Pattern: Sleeps all night    Risk Factors  Self  Harm/Suicidal Ideation Plan: Patient deneis  Previous Self Harm/Suicidal Plans: none  Risk Factors: None    Violence Risk Assessment  Assessment of Violence: None noted  Thoughts of Harm to Others: No    Ability to Assess Risk Screen  Risk Screen - Ability to Assess: Able to be screened  Ask Suicide-Screening Questions  1. In the past few weeks, have you wished you were dead?: No  2. In the past few weeks, have you felt that you or your family would be better off if you were dead?: No  3. In the past week, have you been having thoughts about killing yourself?: No  4. Have you ever tried to kill yourself?: No  5. Are you having thoughts of killing yourself right now?: No  Calculated Risk Score: No intervention is necessary  [place] Suicide Severity Rating Scale (Screener/Recent Self-Report)  1. Wish to be Dead (Past 1 Month): No  2. Non-Specific Active Suicidal Thoughts (Past 1 Month): No  6. Suicidal Behavior (Lifetime): No  Calculated C-SSRS Risk Score (Lifetime/Recent): No Risk Indicated  Step 1: Risk Factors  Current & Past Psychiatric Dx: Mood disorder, Alcohol/substance abuse disorders  Presenting Symptoms: Impulsivity, Anxiety and/or panic  Family History: Other (Comment)  Precipitants/Stressors: Triggering events leading to humiliation, shame, and/or despair (e.g. loss of relationship, financial or health status) (real or anticipated)  Change in Treatment: Non-compliant or not receiving treatment  Access to Lethal Methods : No  Step 2: Protective Factors   Protective Factors Internal: Identifies reasons for living, Ability to cope with stress  Protective Factors External: Cultural, spiritual and/or moral attitudes against suicide, Supportive social network or family or friends  Step 3: Suicidal Ideation Intensity  How Many Times Have You Had These Thoughts: Less than once a week  When You Have the Thoughts How Long do They Last : Fleeting - few seconds or minutes  Could/Can You Stop Thinking About Killing  "Yourself or Wanting to Die if You Want to: Does not attempt to control thoughts  Are There Things - Anyone or Anything - That Stopped You From Wanting to Die or Acting on: Does not apply  What Sort of Reasons Did You Have For Thinking About Wanting to Die or Killing Yourself: Does not apply  Total Score: 2  Step 5: Documentation  Risk Level: Low suicide risk (Patient is low risk. / Shauna agrees.)    Psychiatric Impression and Plan of Care  Assessment and Plan: Patient is a 29 year old female who presented to the ED via police. The patient was intoxicated and stating she was hallucinating. During triage she became agitated and aggressive. She was medicated. The patient was sitting on her bed during the assessment. She was cooperative. She shared she is \"feeling better\" since she sobered uip. The patient reports drinking 1-3 times per week and admitted to using Cocaine this past week. The patient denies any suicidal thoughts or history of self harm. She denies any homicidal thoughts. The patient sees her PCP for outpatient medications. She is future oriented and help seeking. She reports hearing \"something call my name\" most days.  Specific Resources Provided to Patient: none  CM Notified: no  PHP/IOP Recommended: none  Specific Information Provided for PHP/IOP: discharge  Plan Comments: n/a    Outcome/Disposition  Patient's Perception of Outcome Achieved: patient is help seeking  Assessment, Recommendations and Risk Level Reviewed with: discharge  Contact Name: Amanda Avalos  Contact Number(s): [phone]  Contact Relationship: Mother  EPAT Assessment Completed Date: 01/30/25  EPAT Assessment Completed Time: 1526  Patient Disposition: Home      "
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
## 2025-01-31 LAB
ATRIAL RATE: 84 BPM
P AXIS: 69 DEGREES
P OFFSET: 185 MS
P ONSET: 136 MS
PR INTERVAL: 170 MS
Q ONSET: 221 MS
QRS COUNT: 14 BEATS
QRS DURATION: 78 MS
QT INTERVAL: 388 MS
QTC CALCULATION(BAZETT): 458 MS
QTC FREDERICIA: 433 MS
R AXIS: 34 DEGREES
T AXIS: 55 DEGREES
T OFFSET: 415 MS
VENTRICULAR RATE: 84 BPM

## 2025-02-12 ENCOUNTER — DOCUMENTATION (OUTPATIENT)
Dept: PRIMARY CARE | Facility: CLINIC | Age: 30
End: 2025-02-12
Payer: COMMERCIAL

## 2025-02-13 ENCOUNTER — PATIENT OUTREACH (OUTPATIENT)
Dept: PRIMARY CARE | Facility: CLINIC | Age: 30
End: 2025-02-13
Payer: COMMERCIAL

## 2025-02-13 RX ORDER — GABAPENTIN 300 MG/1
CAPSULE ORAL
COMMUNITY
Start: 2025-02-07

## 2025-02-13 RX ORDER — TRAZODONE HYDROCHLORIDE 50 MG/1
TABLET ORAL
COMMUNITY
Start: 2025-02-07

## 2025-02-13 RX ORDER — ARIPIPRAZOLE 10 MG/1
10 TABLET ORAL
COMMUNITY
Start: 2025-02-07

## 2025-02-13 NOTE — PROGRESS NOTES
Discharge Facility: [facility]  Discharge Diagnosis: chest pain  Admission Date: 2/10/25  Discharge Date: 2/11/25    PCP Appointment Date: TBD  Specialist Appointment Date: 2/25 vascular medicine  Hospital Encounter and Summary Linked: Yes  Transitional Care Management Enrollment with Stella Dolan RN (02/12/2025)     Two attempts were made to reach patient within two business days after discharge. Voicemail left with contact information for patient to call back with any non-emergent questions or concerns.

## 2025-02-26 ENCOUNTER — APPOINTMENT (OUTPATIENT)
Dept: PRIMARY CARE | Facility: CLINIC | Age: 30
End: 2025-02-26
Payer: COMMERCIAL

## 2025-02-26 DIAGNOSIS — G89.29 OTHER CHRONIC PAIN: Primary | ICD-10-CM

## 2025-02-26 RX ORDER — GABAPENTIN 300 MG/1
300 CAPSULE ORAL 3 TIMES DAILY
Qty: 290 CAPSULE | Refills: 1 | Status: SHIPPED | OUTPATIENT
Start: 2025-02-26

## 2025-02-26 NOTE — PROGRESS NOTES
Subjective   Patient ID: Liliana Jay is a 29 y.o. female who presents for Post Hospitalization.    HPI     Telephone visit for follow-up.  Had been admitted to [facility] for chronic suicidal ideation that had worsened.  Discharge summary was reviewed.  She was started on Abilify 10 mg daily and gabapentin 300 mg 3 times a day.  On day of discharge she denied any suicidal ideation, homicidal ideation or audiovisual hallucinations.  She had a follow-up yesterday with psychology as a follow-up today with psychiatry    Recently had an ED visit for jaw pain thought stopped related to a tooth that sounds like needs a root canal in the future.  Had been started on a penicillin-based antibiotic      Review of Systems    8 point review of systems is otherwise negative unless mentioned on HPI      Objective   There were no vitals taken for this visit.    Physical Exam    Telephone visit     Assessment/Plan       #Hospital follow-up  #Suicidal ideation  #Anxiety/Depression  -Previously had taken Lexapro, when had restarted did not think it had been effective as before was since transitioned to Zoloft  -Was briefly out of lexapro, since restarting, does not think has been effective as past  -Had been on celexa in the past  -Had been on duloxetine in the past, had suicidal thoughts while on this medication  -Previously was taking lorazepam as needed that was stopped after mental health hospitalization   -Has been started on Abilify 10 mg daily  -Has been starting gabapentin 300 mg 3 times a day  -Had follow-up yesterday with psychology and psychiatry today    #History of VTE  #Prothrombin gene disorder  - On lifetime anticoagulation, on Eliquis  - Followed with Hematology at [facility]     #chronic neck pain  #chronic low back pain  - Did not get any relief from gabapentin  - Has been seen by Pain mgmt, not a good candidate for injection d/t AC use  - Has been on zanaflex for the chronic pain, continue  - Takes  Tylenol 4-5 times per week for pain  - Magnesium supplementation     #Seizure  -Follows with [facility] neurology, on keppra      #Gastroparesis  -Had plan to follow-up with gastroparesis through [facility]     #Forearm/hand numbness  -Improvement in forearm numbness with B12 injections, received weekly x4     RTC 6 months     This note was dictated by speech recognition. Minor errors in transcription may be present.

## 2025-02-27 ENCOUNTER — PATIENT OUTREACH (OUTPATIENT)
Dept: PRIMARY CARE | Facility: CLINIC | Age: 30
End: 2025-02-27
Payer: COMMERCIAL

## 2025-03-02 ENCOUNTER — HOSPITAL ENCOUNTER (EMERGENCY)
Facility: HOSPITAL | Age: 30
Discharge: HOME | End: 2025-03-02
Payer: COMMERCIAL

## 2025-03-02 ENCOUNTER — APPOINTMENT (OUTPATIENT)
Dept: RADIOLOGY | Facility: HOSPITAL | Age: 30
End: 2025-03-02
Payer: COMMERCIAL

## 2025-03-02 VITALS
WEIGHT: 199.2 LBS | RESPIRATION RATE: 18 BRPM | HEIGHT: 67 IN | SYSTOLIC BLOOD PRESSURE: 120 MMHG | TEMPERATURE: 97.2 F | BODY MASS INDEX: 31.27 KG/M2 | HEART RATE: 58 BPM | OXYGEN SATURATION: 98 % | DIASTOLIC BLOOD PRESSURE: 58 MMHG

## 2025-03-02 DIAGNOSIS — M25.511 ACUTE PAIN OF RIGHT SHOULDER: Primary | ICD-10-CM

## 2025-03-02 PROCEDURE — 99284 EMERGENCY DEPT VISIT MOD MDM: CPT

## 2025-03-02 PROCEDURE — 73030 X-RAY EXAM OF SHOULDER: CPT | Mod: RIGHT SIDE | Performed by: RADIOLOGY

## 2025-03-02 PROCEDURE — 2500000004 HC RX 250 GENERAL PHARMACY W/ HCPCS (ALT 636 FOR OP/ED): Performed by: NURSE PRACTITIONER

## 2025-03-02 PROCEDURE — 96372 THER/PROPH/DIAG INJ SC/IM: CPT | Performed by: NURSE PRACTITIONER

## 2025-03-02 PROCEDURE — 73030 X-RAY EXAM OF SHOULDER: CPT | Mod: RT

## 2025-03-02 PROCEDURE — 2500000001 HC RX 250 WO HCPCS SELF ADMINISTERED DRUGS (ALT 637 FOR MEDICARE OP): Performed by: NURSE PRACTITIONER

## 2025-03-02 RX ORDER — ACETAMINOPHEN 325 MG/1
650 TABLET ORAL EVERY 6 HOURS PRN
Qty: 30 TABLET | Refills: 0 | Status: SHIPPED | OUTPATIENT
Start: 2025-03-02

## 2025-03-02 RX ORDER — ACETAMINOPHEN 325 MG/1
975 TABLET ORAL ONCE
Status: COMPLETED | OUTPATIENT
Start: 2025-03-02 | End: 2025-03-02

## 2025-03-02 RX ORDER — KETOROLAC TROMETHAMINE 30 MG/ML
30 INJECTION, SOLUTION INTRAMUSCULAR; INTRAVENOUS ONCE
Status: COMPLETED | OUTPATIENT
Start: 2025-03-02 | End: 2025-03-02

## 2025-03-02 RX ADMIN — KETOROLAC TROMETHAMINE 30 MG: 30 INJECTION, SOLUTION INTRAMUSCULAR at 10:57

## 2025-03-02 RX ADMIN — ACETAMINOPHEN 975 MG: 325 TABLET, FILM COATED ORAL at 10:58

## 2025-03-02 ASSESSMENT — COLUMBIA-SUICIDE SEVERITY RATING SCALE - C-SSRS
1. IN THE PAST MONTH, HAVE YOU WISHED YOU WERE DEAD OR WISHED YOU COULD GO TO SLEEP AND NOT WAKE UP?: NO
2. HAVE YOU ACTUALLY HAD ANY THOUGHTS OF KILLING YOURSELF?: NO
6. HAVE YOU EVER DONE ANYTHING, STARTED TO DO ANYTHING, OR PREPARED TO DO ANYTHING TO END YOUR LIFE?: NO

## 2025-03-02 ASSESSMENT — PAIN DESCRIPTION - PAIN TYPE: TYPE: ACUTE PAIN

## 2025-03-02 ASSESSMENT — PAIN SCALES - GENERAL: PAINLEVEL_OUTOF10: 10 - WORST POSSIBLE PAIN

## 2025-03-02 ASSESSMENT — PAIN DESCRIPTION - LOCATION: LOCATION: SHOULDER

## 2025-03-02 ASSESSMENT — PAIN - FUNCTIONAL ASSESSMENT: PAIN_FUNCTIONAL_ASSESSMENT: 0-10

## 2025-03-02 ASSESSMENT — PAIN DESCRIPTION - ORIENTATION: ORIENTATION: RIGHT

## 2025-03-02 NOTE — ED PROVIDER NOTES
Emergency Department Provider Note        History of Present Illness     History provided by: Patient  Limitations to History: None    HPI:  Liliana Jay is a 29 y.o. female who presents to the emergency department today due to right shoulder pain.  Patient states she does have shoulder pain in the past and had shoulder surgery on the left due to a torn labrum.  Patient is known she might have this happening on the right side.  She does not recall any specific injury.  She states she woke up today and was having difficulty raising her hand above the level of her shoulder due to shoulder pain.  She denies any distal numbness or weakness.    Physical Exam   Triage vitals:  T 36.2 °C (97.2 °F)  HR 71  /67  RR 16  O2 97 % None (Room air)    Physical Exam     Medical Decision Making & ED Course   Medical Decision Makin y.o. female who presents to the emergency department today due to right shoulder pain.  Patient states she does have shoulder pain in the past and had shoulder surgery on the left due to a torn labrum.  Patient is known she might have this happening on the right side.  She does not recall any specific injury.  She states she woke up today and was having difficulty raising her hand above the level of her shoulder due to shoulder pain.  She denies any distal numbness or weakness.  Patient was given IM Toradol with moderate relief of pain.  Patient's x-ray imaging shows no acute fracture or subluxation.  Her examination is concerning for possible rotator cuff injury versus shoulder strain.  She was vies follow-up with her orthopedic surgeon.  Referral was given back to orthopedic surgeon and also advised follow-up with her PCP.  I did prescribe her Tylenol as she is unable to take NSAIDs orally due to nausea and vomiting.  ----    Differential diagnoses considered include but are not limited to: Musculoskeletal pain, strain, dislocation, rotator cuff injury.     Social Determinants of  Health which Significantly Impact Care: None identified     EKG Independent Interpretation: EKG not obtained    Independent Result Review and Interpretation: Relevant laboratory and radiographic results were reviewed and independently interpreted by myself.  As necessary, they are commented on in the ED Course.    Chronic conditions affecting the patient's care: As documented above in [facility]    The patient was discussed with the following consultants/services: None    Care Considerations: As documented above in [facility]    ED Course:  ED Course as of 03/02/25 1137   Sun Mar 02, 2025   1047 XR shoulder right 2+ views  No acute osseous abnormality. [WS]      ED Course User Index  [WS] BRYANT Cee         Diagnoses as of 03/02/25 1137   Acute pain of right shoulder     Disposition   As a result of the work-up, the patient was discharged home.  she was informed of her diagnosis and instructed to come back with any concerns or worsening of condition.  she and was agreeable to the plan as discussed above.  she was given the opportunity to ask questions.  All of the patient's questions were answered.    Procedures   Procedures    Patient was seen independently    BRYANT Cee  Emergency Medicine     BRYANT Cee  03/02/25 1139

## 2025-03-02 NOTE — ED TRIAGE NOTES
Patient present to the emergency room c/o  right shoulder pain x2 days that has gotten increasingly worse today. 10/10, denies any traumatic injury to shoulder. Patient states she took tizanidine and tylenol with no relief

## 2025-03-04 ENCOUNTER — TELEPHONE (OUTPATIENT)
Dept: PRIMARY CARE | Facility: CLINIC | Age: 30
End: 2025-03-04
Payer: COMMERCIAL

## 2025-03-11 ENCOUNTER — OFFICE VISIT (OUTPATIENT)
Dept: ORTHOPEDIC SURGERY | Facility: CLINIC | Age: 30
End: 2025-03-11
Payer: COMMERCIAL

## 2025-03-11 VITALS — HEIGHT: 67 IN | WEIGHT: 198 LBS | BODY MASS INDEX: 31.08 KG/M2

## 2025-03-11 DIAGNOSIS — M25.511 ACUTE PAIN OF RIGHT SHOULDER: ICD-10-CM

## 2025-03-11 PROCEDURE — 99203 OFFICE O/P NEW LOW 30 MIN: CPT | Performed by: ORTHOPAEDIC SURGERY

## 2025-03-11 PROCEDURE — 99213 OFFICE O/P EST LOW 20 MIN: CPT | Performed by: ORTHOPAEDIC SURGERY

## 2025-03-11 PROCEDURE — 3008F BODY MASS INDEX DOCD: CPT | Performed by: ORTHOPAEDIC SURGERY

## 2025-03-11 RX ORDER — VORTIOXETINE 5 MG/1
1 TABLET, FILM COATED ORAL
COMMUNITY
Start: 2025-02-27

## 2025-03-11 NOTE — PROGRESS NOTES
30-year-old is seen with right shoulder that she has had for years but has become particularly worse over the last few weeks.  She has been having persistent severe sharp shooting pain and difficulty with overhead reaching and lifting activities.  She is on Eliquis for a clotting disorder.  She had a left shoulder arthroscopy with labral repair and bicep tenodesis in 2019 and has done very well with the left shoulder.  Symptoms are similar in her right shoulder now.  She has done physical therapy through the years for both shoulders and she continues with a home exercise program.  She is taken Tylenol and applied ice.  She has been to the emergency room.  She was prescribed a muscle relaxant.    Pleasant and no acute distress.  Right shoulder forward flexion 160°. No effusion and she guards with stability testing there is positive Neer and Davis impingement. There is subacromial crepitus and tenderness around the subacromial space. There is biceps tenderness. There is a positive Harmon's test. No acromioclavicular tenderness. Rotator cuff strength is decreased and there is discomfort with strength testing. Left shoulder forward flexion 180°. No effusion or instability. No impingement or crepitus or tenderness involving the subacromial space. No biceps or acromioclavicular tenderness. There is intact rotator cuff strength. There is adequate range of motion of the cervical spine without pain. Both upper extremities are well perfused, skin is intact and muscle tone is adequate. Elbow flexion and extension and wrist flexion and extension strength are intact.    Multiple x-ray views of the right shoulder are personally reviewed and there is no acute bony abnormality.    A discussion about her shoulder was done.  It is concerning that she has a labral tear and potentially rotator cuff tear based on her history and exam findings.  She has had years of problems with the shoulder and is done a physical therapy program and  still has pain.  She will be sent for MRI to evaluate the labrum and rotator cuff.  There is potential need for shoulder arthroscopy depending on the MRI result.  She can take Tylenol but should not take NSAIDs while on Eliquis.

## 2025-03-26 ENCOUNTER — HOSPITAL ENCOUNTER (OUTPATIENT)
Dept: RADIOLOGY | Facility: CLINIC | Age: 30
Discharge: HOME | End: 2025-03-26
Payer: COMMERCIAL

## 2025-03-26 DIAGNOSIS — M25.511 ACUTE PAIN OF RIGHT SHOULDER: ICD-10-CM

## 2025-03-26 PROCEDURE — 73221 MRI JOINT UPR EXTREM W/O DYE: CPT | Mod: RT

## 2025-03-27 ENCOUNTER — PATIENT OUTREACH (OUTPATIENT)
Dept: PRIMARY CARE | Facility: CLINIC | Age: 30
End: 2025-03-27
Payer: COMMERCIAL

## 2025-03-31 DIAGNOSIS — F41.9 ANXIETY: ICD-10-CM

## 2025-03-31 RX ORDER — HYDROXYZINE HYDROCHLORIDE 25 MG/1
25 TABLET, FILM COATED ORAL 2 TIMES DAILY PRN
Qty: 180 TABLET | Refills: 1 | Status: SHIPPED | OUTPATIENT
Start: 2025-03-31 | End: 2025-07-29

## 2025-04-02 NOTE — PROGRESS NOTES
Call regarding appt. with PCP on 2/26/25 after hospitalization.  At time of outreach call the patient feels as if their condition has returned to baseline since last visit.  Reviewed the PCP appointment with the pt and addressed any questions or concerns.  Says she spoke with her psychiatrist and she will start weaning off zoloft and trialing a new med, she will add to her my chart once she finds the bottle.  
No

## 2025-04-08 ENCOUNTER — OFFICE VISIT (OUTPATIENT)
Dept: ORTHOPEDIC SURGERY | Facility: CLINIC | Age: 30
End: 2025-04-08
Payer: COMMERCIAL

## 2025-04-08 DIAGNOSIS — M25.811 IMPINGEMENT OF RIGHT SHOULDER: Primary | ICD-10-CM

## 2025-04-08 PROCEDURE — 99214 OFFICE O/P EST MOD 30 MIN: CPT | Performed by: ORTHOPAEDIC SURGERY

## 2025-04-08 RX ORDER — CEFAZOLIN SODIUM 2 G/100ML
2 INJECTION, SOLUTION INTRAVENOUS ONCE
OUTPATIENT
Start: 2025-04-08 | End: 2025-04-08

## 2025-04-08 NOTE — LETTER
April 9, 2025     Phuc Vaz MD  [address], [address]  [place] [address]    Patient: Liliana Jay   YOB: 1995   Date of Visit: 4/8/2025       Dear Dr. Phuc Vaz MD:    Thank you for referring Liliana Jay to me for evaluation. Below are my notes for this consultation.  If you have questions, please do not hesitate to call me. I look forward to following your patient along with you.       Sincerely,     Camden Ellis MD      CC: No Recipients  ______________________________________________________________________________________    30-year-old is seen for her right shoulder.  She has been having persistent moderate throbbing pain that is worse with overhead reaching and lifting activities.  This becomes a severe sharp shooting pain when she tries to use the arm.  She has difficulty sleeping.  She is on Eliquis for a clotting disorder.  She had left shoulder arthroscopy with labral repair and bicep tenodesis in 2019 and is doing very well with the left shoulder.  She performs an exercise program and tries to avoid aggravating activities.  She has taken Tylenol and applied ice.  She has used a muscle relaxant.    Pleasant and no acute distress.  Right shoulder forward flexion 160°. No effusion or instability. There is positive Neer and Davis impingement. There is subacromial crepitus and tenderness around the subacromial space. There is biceps tenderness. There is a positive Lykens's test. No acromioclavicular tenderness. Rotator cuff strength is decreased but there is discomfort with strength testing. Left shoulder forward flexion 180°. No effusion or instability. No impingement or crepitus or tenderness involving the subacromial space. No biceps or acromioclavicular tenderness. There is intact rotator cuff strength. There is adequate range of motion of the cervical spine without pain. Both upper extremities are well perfused, skin is  intact and muscle tone is adequate. Elbow flexion and extension and wrist flexion and extension strength are intact.    Multiple x-ray views of the right shoulder are personally reviewed and there is no acute bony abnormality.    MRI of the right shoulder is personally reviewed and the rotator cuff appears to be intact to the lesser and greater tuberosities.  There is extensive subacromial bursitis.  The bicep tendon appears to be intact.  No obvious labral tearing.    A detailed discussion about her shoulder was done.  Treatment options including no treatment reviewed.  She has had extensive conservative treatment but has persistent debilitating pain.  The decision was made to proceed with right shoulder arthroscopy with extensive debridement and subacromial decompression.  Treatment of the labrum or rotator cuff or bicep tendon would be done as needed based on intraoperative findings and this was discussed with her.  She will avoid aggravating activities in the meantime.  She will need to be able to stop Eliquis 3 days prior to surgery and then will be able to resume postoperatively.

## 2025-04-09 PROBLEM — M25.811 IMPINGEMENT OF RIGHT SHOULDER: Status: ACTIVE | Noted: 2025-04-08

## 2025-04-09 NOTE — PROGRESS NOTES
30-year-old is seen for her right shoulder.  She has been having persistent moderate throbbing pain that is worse with overhead reaching and lifting activities.  This becomes a severe sharp shooting pain when she tries to use the arm.  She has difficulty sleeping.  She is on Eliquis for a clotting disorder.  She had left shoulder arthroscopy with labral repair and bicep tenodesis in 2019 and is doing very well with the left shoulder.  She performs an exercise program and tries to avoid aggravating activities.  She has taken Tylenol and applied ice.  She has used a muscle relaxant.    Pleasant and no acute distress.  Right shoulder forward flexion 160°. No effusion or instability. There is positive Neer and Davis impingement. There is subacromial crepitus and tenderness around the subacromial space. There is biceps tenderness. There is a positive [place]'s test. No acromioclavicular tenderness. Rotator cuff strength is decreased but there is discomfort with strength testing. Left shoulder forward flexion 180°. No effusion or instability. No impingement or crepitus or tenderness involving the subacromial space. No biceps or acromioclavicular tenderness. There is intact rotator cuff strength. There is adequate range of motion of the cervical spine without pain. Both upper extremities are well perfused, skin is intact and muscle tone is adequate. Elbow flexion and extension and wrist flexion and extension strength are intact.    Multiple x-ray views of the right shoulder are personally reviewed and there is no acute bony abnormality.    MRI of the right shoulder is personally reviewed and the rotator cuff appears to be intact to the lesser and greater tuberosities.  There is extensive subacromial bursitis.  The bicep tendon appears to be intact.  No obvious labral tearing.    A detailed discussion about her shoulder was done.  Treatment options including no treatment reviewed.  She has had extensive conservative  treatment but has persistent debilitating pain.  The decision was made to proceed with right shoulder arthroscopy with extensive debridement and subacromial decompression.  Treatment of the labrum or rotator cuff or bicep tendon would be done as needed based on intraoperative findings and this was discussed with her.  She will avoid aggravating activities in the meantime.  She will need to be able to stop Eliquis 3 days prior to surgery and then will be able to resume postoperatively.

## 2025-04-10 ENCOUNTER — TELEPHONE (OUTPATIENT)
Dept: PRIMARY CARE | Facility: CLINIC | Age: 30
End: 2025-04-10
Payer: COMMERCIAL

## 2025-04-10 NOTE — TELEPHONE ENCOUNTER
Patient was told to ask pcp about this: is having shoulder surgery next month, when should she stop taking her blood thinner and when to restart. (Is it the normal 5 days prior to procedure and the day after to restart?)  Also says that needs ok from pcp for clearance for the procedure.

## 2025-04-16 ENCOUNTER — TELEPHONE (OUTPATIENT)
Dept: PRIMARY CARE | Facility: CLINIC | Age: 30
End: 2025-04-16
Payer: COMMERCIAL

## 2025-04-16 NOTE — TELEPHONE ENCOUNTER
Question, who orders the lovenox injections? Is it you or the surgeon?    Also, patient is having 2 teeth extracted on Monday, will she need to stop warfarin and for how long? And does she need to be bridged with lovenox?

## 2025-04-17 DIAGNOSIS — I26.99 PULMONARY EMBOLISM, UNSPECIFIED CHRONICITY, UNSPECIFIED PULMONARY EMBOLISM TYPE, UNSPECIFIED WHETHER ACUTE COR PULMONALE PRESENT (MULTI): Primary | ICD-10-CM

## 2025-04-17 RX ORDER — ENOXAPARIN SODIUM 100 MG/ML
90 INJECTION SUBCUTANEOUS EVERY 12 HOURS
Qty: 20 EACH | Refills: 0 | Status: SHIPPED | OUTPATIENT
Start: 2025-04-17

## 2025-04-30 ENCOUNTER — PATIENT OUTREACH (OUTPATIENT)
Dept: PRIMARY CARE | Facility: CLINIC | Age: 30
End: 2025-04-30
Payer: COMMERCIAL

## 2025-05-12 ENCOUNTER — TELEPHONE (OUTPATIENT)
Dept: PRIMARY CARE | Facility: CLINIC | Age: 30
End: 2025-05-12
Payer: COMMERCIAL

## 2025-05-12 NOTE — TELEPHONE ENCOUNTER
Which is correct please? Patient will be having shoulder surgery on 5/22 and taking elequis. Should she stop 3 or 5 days prior. On 4/10 says 3 and on 4/16 says 5. Thank you

## 2025-05-13 ENCOUNTER — OFFICE VISIT (OUTPATIENT)
Dept: PRIMARY CARE | Facility: CLINIC | Age: 30
End: 2025-05-13
Payer: COMMERCIAL

## 2025-05-13 VITALS
TEMPERATURE: 98.1 F | WEIGHT: 204 LBS | DIASTOLIC BLOOD PRESSURE: 68 MMHG | HEIGHT: 67 IN | HEART RATE: 99 BPM | OXYGEN SATURATION: 98 % | SYSTOLIC BLOOD PRESSURE: 127 MMHG | BODY MASS INDEX: 32.02 KG/M2

## 2025-05-13 DIAGNOSIS — D68.52 PROTHROMBIN GENE MUTATION: ICD-10-CM

## 2025-05-13 DIAGNOSIS — R19.8 ABNORMAL BOWEL HABITS: Primary | ICD-10-CM

## 2025-05-13 DIAGNOSIS — I26.99 RECURRENT PULMONARY EMBOLI (MULTI): ICD-10-CM

## 2025-05-13 PROCEDURE — 3074F SYST BP LT 130 MM HG: CPT | Performed by: STUDENT IN AN ORGANIZED HEALTH CARE EDUCATION/TRAINING PROGRAM

## 2025-05-13 PROCEDURE — 3078F DIAST BP <80 MM HG: CPT | Performed by: STUDENT IN AN ORGANIZED HEALTH CARE EDUCATION/TRAINING PROGRAM

## 2025-05-13 PROCEDURE — 3008F BODY MASS INDEX DOCD: CPT | Performed by: STUDENT IN AN ORGANIZED HEALTH CARE EDUCATION/TRAINING PROGRAM

## 2025-05-13 PROCEDURE — 99214 OFFICE O/P EST MOD 30 MIN: CPT | Performed by: STUDENT IN AN ORGANIZED HEALTH CARE EDUCATION/TRAINING PROGRAM

## 2025-05-13 RX ORDER — LAMOTRIGINE 25 MG/1
TABLET ORAL
COMMUNITY

## 2025-05-13 RX ORDER — VILAZODONE HYDROCHLORIDE 10 MG/1
10 TABLET ORAL DAILY
COMMUNITY

## 2025-05-13 RX ORDER — SENNOSIDES 8.6 MG
1 TABLET ORAL 2 TIMES DAILY
Qty: 180 TABLET | Refills: 1 | Status: SHIPPED | OUTPATIENT
Start: 2025-05-13 | End: 2025-11-09

## 2025-05-13 ASSESSMENT — PAIN SCALES - GENERAL: PAINLEVEL_OUTOF10: 6

## 2025-05-13 NOTE — PROGRESS NOTES
"Subjective   Patient ID: Liliana Jay is a 30 y.o. female who presents for Pre-op Clearance.    HPI     Pre-op evaluation. Planned for right shoulder arthroscopy on May 22nd. Had VTE in the past when was off all anticoagulation for a few days prior to a EGD    Review of Systems    8 point review of systems is otherwise negative unless mentioned on HPI      Objective   /68 (BP Location: Left arm, Patient Position: Sitting, BP Cuff Size: Adult)   Pulse 99   Temp 36.7 °C (98.1 °F)   Ht 1.702 m (5' 7\")   Wt 92.5 kg (204 lb)   LMP 07/15/2019   SpO2 98%   BMI 31.95 kg/m²     Physical Exam  Constitutional:       General: She is not in acute distress.  HENT:      Head: Normocephalic.   Eyes:      Extraocular Movements: Extraocular movements intact.   Cardiovascular:      Rate and Rhythm: Normal rate and regular rhythm.      Heart sounds: No murmur heard.  Pulmonary:      Breath sounds: No wheezing.   Abdominal:      Palpations: Abdomen is soft.      Tenderness: There is no abdominal tenderness.   Skin:     Findings: No rash.   Neurological:      Mental Status: Mental status is at baseline.   Psychiatric:         Mood and Affect: Mood normal.         Assessment/Plan       #Pre-op evaluation  -Planned for right shoulder arthroscopy on May 22nd  -Had VTE in the past when anticoagulation was interrupted for EGD for a few days. Will recommend to stop Eliquis 5 days prior to surgery, will then take lovenox 90mg BID with last dose being evening before surgery. Restart Eliquis day after surgery    #Anxiety/Depression  -Previously had taken Lexapro, when had restarted did not think it had been effective as before was since transitioned to Zoloft  -Was briefly out of lexapro, since restarting, does not think has been effective as past  -Had been on celexa in the past  -Had been on duloxetine in the past, had suicidal thoughts while on this medication  -Previously was taking lorazepam as needed that was stopped after " mental health hospitalization   -Has been started on Abilify 10 mg daily  -Has been starting gabapentin 300 mg 3 times a day  -Vibryd 10mg was the most recent addition  -Follows with psychiatry monthly     #History of VTE  #Prothrombin gene disorder  - On lifetime anticoagulation, on Eliquis  - Followed with Hematology at [facility]     #chronic neck pain  #chronic low back pain  - Did not get any relief from gabapentin  - Has been seen by Pain mgmt, not a good candidate for injection d/t AC use  - Has been on zanaflex for the chronic pain, continue  - Takes Tylenol 4-5 times per week for pain  - Magnesium supplementation     #Seizure  -Follows with [facility] neurology, on keppra      #Gastroparesis  -Had plan to follow-up with gastroparesis through [facility]     #Forearm/hand numbness  -Improvement in forearm numbness with B12 injections, received weekly x4     RTC 6 months     This note was dictated by speech recognition. Minor errors in transcription may be present.

## 2025-05-13 NOTE — H&P (VIEW-ONLY)
"Subjective   Patient ID: Liliana Jay is a 30 y.o. female who presents for Pre-op Clearance.    HPI     Pre-op evaluation. Planned for right shoulder arthroscopy on May 22nd. Had VTE in the past when was off all anticoagulation for a few days prior to a EGD    Review of Systems    8 point review of systems is otherwise negative unless mentioned on HPI      Objective   /68 (BP Location: Left arm, Patient Position: Sitting, BP Cuff Size: Adult)   Pulse 99   Temp 36.7 °C (98.1 °F)   Ht 1.702 m (5' 7\")   Wt 92.5 kg (204 lb)   LMP 07/15/2019   SpO2 98%   BMI 31.95 kg/m²     Physical Exam  Constitutional:       General: She is not in acute distress.  HENT:      Head: Normocephalic.   Eyes:      Extraocular Movements: Extraocular movements intact.   Cardiovascular:      Rate and Rhythm: Normal rate and regular rhythm.      Heart sounds: No murmur heard.  Pulmonary:      Breath sounds: No wheezing.   Abdominal:      Palpations: Abdomen is soft.      Tenderness: There is no abdominal tenderness.   Skin:     Findings: No rash.   Neurological:      Mental Status: Mental status is at baseline.   Psychiatric:         Mood and Affect: Mood normal.         Assessment/Plan       #Pre-op evaluation  -Planned for right shoulder arthroscopy on May 22nd  -Had VTE in the past when anticoagulation was interrupted for EGD for a few days. Will recommend to stop Eliquis 5 days prior to surgery, will then take lovenox 90mg BID with last dose being evening before surgery. Restart Eliquis day after surgery    #Anxiety/Depression  -Previously had taken Lexapro, when had restarted did not think it had been effective as before was since transitioned to Zoloft  -Was briefly out of lexapro, since restarting, does not think has been effective as past  -Had been on celexa in the past  -Had been on duloxetine in the past, had suicidal thoughts while on this medication  -Previously was taking lorazepam as needed that was stopped after " mental health hospitalization   -Has been started on Abilify 10 mg daily  -Has been starting gabapentin 300 mg 3 times a day  -Vibryd 10mg was the most recent addition  -Follows with psychiatry monthly     #History of VTE  #Prothrombin gene disorder  - On lifetime anticoagulation, on Eliquis  - Followed with Hematology at [facility]     #chronic neck pain  #chronic low back pain  - Did not get any relief from gabapentin  - Has been seen by Pain mgmt, not a good candidate for injection d/t AC use  - Has been on zanaflex for the chronic pain, continue  - Takes Tylenol 4-5 times per week for pain  - Magnesium supplementation     #Seizure  -Follows with [facility] neurology, on keppra      #Gastroparesis  -Had plan to follow-up with gastroparesis through [facility]     #Forearm/hand numbness  -Improvement in forearm numbness with B12 injections, received weekly x4     RTC 6 months     This note was dictated by speech recognition. Minor errors in transcription may be present.

## 2025-05-14 ENCOUNTER — PRE-ADMISSION TESTING (OUTPATIENT)
Dept: PREADMISSION TESTING | Facility: HOSPITAL | Age: 30
End: 2025-05-14
Payer: COMMERCIAL

## 2025-05-14 ENCOUNTER — APPOINTMENT (OUTPATIENT)
Dept: ORTHOPEDIC SURGERY | Facility: CLINIC | Age: 30
End: 2025-05-14
Payer: COMMERCIAL

## 2025-05-14 VITALS
RESPIRATION RATE: 16 BRPM | WEIGHT: 201.72 LBS | DIASTOLIC BLOOD PRESSURE: 81 MMHG | SYSTOLIC BLOOD PRESSURE: 137 MMHG | HEART RATE: 64 BPM | HEIGHT: 67 IN | TEMPERATURE: 97.5 F | BODY MASS INDEX: 31.66 KG/M2 | OXYGEN SATURATION: 97 %

## 2025-05-14 DIAGNOSIS — Z01.818 PREOP TESTING: Primary | ICD-10-CM

## 2025-05-14 PROCEDURE — 87081 CULTURE SCREEN ONLY: CPT | Mod: PARLAB

## 2025-05-14 RX ORDER — CHLORHEXIDINE GLUCONATE 40 MG/ML
SOLUTION TOPICAL DAILY
Qty: 118 ML | Refills: 0 | Status: SHIPPED | OUTPATIENT
Start: 2025-05-14 | End: 2025-05-19

## 2025-05-14 RX ORDER — CHLORHEXIDINE GLUCONATE ORAL RINSE 1.2 MG/ML
15 SOLUTION DENTAL DAILY
Qty: 30 ML | Refills: 0 | Status: SHIPPED | OUTPATIENT
Start: 2025-05-14 | End: 2025-05-16

## 2025-05-14 ASSESSMENT — DUKE ACTIVITY SCORE INDEX (DASI)
CAN YOU HAVE SEXUAL RELATIONS: YES
CAN YOU PARTICIPATE IN STRENOUS SPORTS LIKE SWIMMING, SINGLES TENNIS, FOOTBALL, BASKETBALL, OR SKIING: NO
CAN YOU DO LIGHT WORK AROUND THE HOUSE LIKE DUSTING OR WASHING DISHES: YES
CAN YOU DO HEAVY WORK AROUND THE HOUSE LIKE SCRUBBING FLOORS OR LIFTING AND MOVING HEAVY FURNITURE: NO
CAN YOU RUN A SHORT DISTANCE: YES
CAN YOU DO YARD WORK LIKE RAKING LEAVES, WEEDING OR PUSHING A MOWER: NO
DASI METS SCORE: 6.7
CAN YOU WALK INDOORS, SUCH AS AROUND YOUR HOUSE: YES
CAN YOU WALK A BLOCK OR TWO ON LEVEL GROUND: YES
CAN YOU CLIMB A FLIGHT OF STAIRS OR WALK UP A HILL: YES
TOTAL_SCORE: 32.2
CAN YOU TAKE CARE OF YOURSELF (EAT, DRESS, BATHE, OR USE TOILET): YES
CAN YOU PARTICIPATE IN MODERATE RECREATIONAL ACTIVITIES LIKE GOLF, BOWLING, DANCING, DOUBLES TENNIS OR THROWING A BASEBALL OR FOOTBALL: NO
CAN YOU DO MODERATE WORK AROUND THE HOUSE LIKE VACUUMING, SWEEPING FLOORS OR CARRYING GROCERIES: YES

## 2025-05-14 NOTE — PREPROCEDURE INSTRUCTIONS
Medication List            Accurate as of May 14, 2025  2:04 PM. Always use your most recent med list.                acetaminophen 325 mg tablet  Commonly known as: TylenoL  Take 2 tablets (650 mg) by mouth every 6 hours if needed for mild pain (1 - 3) or fever (temp greater than 38.0 C).  Medication Adjustments for Surgery: Take/Use as prescribed     apixaban 5 mg tablet  Commonly known as: Eliquis  Take 1 tablet (5 mg) by mouth 2 times a day.  Additional Medication Adjustments for Surgery: Other (Comment)  Notes to patient: Stop 5 days before surgery and restart day after surgery per PCP recommendation     ARIPiprazole 10 mg tablet  Commonly known as: Abilify  Medication Adjustments for Surgery: Take/Use as prescribed     * chlorhexidine 0.12 % solution  Commonly known as: Peridex  Use 15 mL in the mouth or throat once daily for 2 doses. Swish and Spit day before surgery and again morning on day of surgery.  Medication Adjustments for Surgery: Take/Use as prescribed     * chlorhexidine 4 % external liquid  Commonly known as: Hibiclens  Apply topically once daily for 5 days. Wash daily for 5 days prior to surgery with day 5 being morning of surgery.  Medication Adjustments for Surgery: Take/Use as prescribed     enoxaparin 100 mg/mL syringe  Commonly known as: Lovenox  Inject 0.9 mL (90 mg) under the skin every 12 hours.  Additional Medication Adjustments for Surgery: Other (Comment)  Notes to patient: Once eliquis is stopped, then take lovenox 90mg BID with last dose being evening before surgery per PCP recommendation     gabapentin 300 mg capsule  Commonly known as: Neurontin  Take 1 capsule (300 mg) by mouth 3 times a day.  Medication Adjustments for Surgery: Take/Use as prescribed     hydrOXYzine HCL 25 mg tablet  Commonly known as: Atarax  TAKE 1 TABLET (25 MG) BY MOUTH 2 TIMES A DAY AS NEEDED FOR ANXIETY.  Medication Adjustments for Surgery: Do Not take on the morning of surgery     lamoTRIgine 25 mg  tablet  Commonly known as: LaMICtal  Medication Adjustments for Surgery: Take/Use as prescribed     levETIRAcetam 750 mg tablet  Commonly known as: Keppra  Notes to patient: States not currently taking     senna 8.6 mg tablet  Generic drug: sennosides  Take 1 tablet (8.6 mg) by mouth 2 times a day.  Medication Adjustments for Surgery: Do Not take on the morning of surgery     Slow-Mag 71.5 mg tablet,delayed release (DR/EC)  Generic drug: magnesium chloride  Take 1 tablet (71.5 mg) by mouth 2 times a day.  Medication Adjustments for Surgery: Do Not take on the morning of surgery     tiZANidine 4 mg tablet  Commonly known as: Zanaflex  TAKE 1 OR 2 TABLETS AT NIGHT AS NEEDED FOR BACK PAIN/SPASM  Medication Adjustments for Surgery: Do Not take on the morning of surgery     traZODone 50 mg tablet  Commonly known as: Desyrel  Medication Adjustments for Surgery: Take/Use as prescribed     Viibryd 10 mg tablet  Generic drug: vilazodone  Medication Adjustments for Surgery: Take/Use as prescribed           * This list has 2 medication(s) that are the same as other medications prescribed for you. Read the directions carefully, and ask your doctor or other care provider to review them with you.              PRE-OPERATIVE INSTRUCTIONS FOR SURGERY    *Do not eat anything after midnight the night of surgery.  This includes food of any kind (including hard candy, cough drops, mints).       You may have up to 13 ounces of clear liquid  until TWO hours prior to your scheduled arrival time.  Clear liquids include water, black tea/coffee, (no milk or cream) apple juice and electrolyte drinks (GATORADE).  You may chew gum until TWO hours prior you your surgery/procedure.      PLEASE REVIEW YOUR MEDICATION LIST so as to determine how to take your medications around surgery.      A REMINDER;  ELIQUIS is to be stopped 5 days prior to surgery and substituted with LOVENOX twice daily but HOLD on the day of surgery.  Additionally, you are to  resume ELIQUIS the day after your surgery          *One of our staff members will call you ONE business day before your surgery, between 11 am-2 pm to let you know the time to arrive.  If you have not r  eceived a call by 2 pm, call [phone]      *When you arrive at the hospital-->GO TO Registration on the ground floor    *Stop smoking 24 hours prior to surgery.  No Marijuana, CBD Oil or Vaping for 48 hours    *No alcohol 24 hours prior to surgery    *You will need a responsible adult to drive you home    -No acrylic nails or nail polish on at least one fingernail, NO polish on toes for foot surgery    -You may be asked to remove your dentures, partial plate, eyeglasses or contact lenses before going to surgery.  Please bring a case for these items.    -Body piercings need to be removed.  Jewelry and valuables should be left at home.    -Put on loose,  comfortable, clean clothing, that will accommodate bandages          What is a home antibacterial shower?    This shower is a way of cleaning the skin with a germ killing solution before surgery.  The solution contains chlorhexidine, commonly known as CHG.  CHG is a skin cleanser with germ killing ability.  Let your doctor know if you are allergic to chlorhexidine.    Why do I need to take a preoperative antibacterial shower?    Skin is not sterile.  It is best to try to make your skin as free of germs as possible before surgery.  Proper cleansing with a germ killing soap before surgery can lower the number of germs on your skin.  This helps to reduce the risk of infection at the surgical site.  Following the instructions listed below will help you prepare your skin for surgery.      How do I use the solution?    Steps: Begin using your CHG soap on SUNDAY 5/18/23    *First, wash and rinse your hair using the CHG soap.  Keep CHG soap away from ear canals and eyes.   Rinse completely, do not condition.  Hair extensions should be removed.      *Wash your face with  your normal soap and rinse.   *Apply the CHG solution to a clean wet washcloth.  Turn the water off or move away from the water spray to avoid premature rinsing of the CHG soap as you are applying.  Firmly lather your entire body from the neck down.  Do not use on your face or use on hair that has been colored    Pay special attention to the area(s) where your incision(s) will be located unless they are on your face.  Avoid scrubbing your skin too hard.  The important part is to have the CHG soap sit on your skin for 3 minutes.   *When the 3 minutes are up, turn on the water and rinse the CHG solution off your body completely.    *Do not wash with regular soap after you  have  used the CHG soap solution.  *Pat  yourself dry with a clean, freshly laundered towel.    *Do not apply powders, deodorants or lotions.  *Dress in clean freshly laundered night clothes.    *Be sure to sleep with clean freshly laundered sheets.      *Be aware the CHG will cause stains on fabrics; if you wash them with bleach after use.  Rinse your washcloth and other linens that have contact with CHG completely.  Use only non-chlorine detergents to launder the items  used.  *The morning of surgery is the fifth day.  Repeat the above steps and dress in clean comfortable clothing.       What is oral/dental rinse?    It is mouthwash.  It is a way of cleaning the he mouth with a germ-killing solution before your surgery.  The solution contains chlorhexidine, commonly known as CHG.  It is used inside the mouth to kill a bacteria known as Staphylococcus aureus.  Let your doctor know if you are allergic to Chlorhexidine.    Why do I need to use CHG oral/ dental rinse?    The CHG oral/dental rinse helps to kill bacteria in your mouth know as Staphylococcus aureus.  This reduces the risk of infection at the surgical site.    Using your CHG oral/dental rinse    STEPS:    Use your CHG oral/dental rinse after you brush your teeth the night before (at  bedtime) and the morning of your surgery.  Follow all the directions on your prescription label.  *Use the cap on the container to measure 15 ml    *Swish (gargle if you can) the mouthwash in your mouth for at least 30 seconds, (do not swallow) and spit out  *After you use your CHG rinse, do not rinse your mouth with water, drink or eat.  Please refer to the prescription label for the appropriate time to resume oral intake.    What side effects might I have using the CHG oral/dental rinse?    CHG rinse will stick you plaque on the teeth.  Brush and floss just before use.   Teeth brushing will help avoid staining of the plaque during  use.  Teeth brushing will help avoid staining of plaque during  use.          *If you have any further questions about your pre-op instructions,  not mentioned in this handout, then call [phone]*        What you may be asked to bring to   surgery:    ___Photo ID and insurance information    ___Shoulder sling and pillow    ___Urine specimen       NPO Instructions:    Do not eat any food after midnight the night before your surgery/procedure.  You may have clear liquids until TWO hours before surgery/procedure. This includes water, black tea/coffee, (no milk or cream) apple juice and electrolyte drinks (Gatorade).  You may chew gum up to TWO hours before your surgery/procedure.      Additional Instructions:       Day of Surgery:  You may have clear liquids until TWO hours before surgery/procedure.  This includes water, black tea/coffee, (no milk or cream) apple juice and electrolyte drinks (Gatorade)  You may chew gum up to TWO hours before your surgery/procedure  Wear  comfortable loose fitting clothing  Do not use moisturizers, creams, lotions or perfume  All jewelry and valuables should be left at home

## 2025-05-15 LAB — STAPHYLOCOCCUS SPEC CULT: ABNORMAL

## 2025-05-22 ENCOUNTER — ANESTHESIA EVENT (OUTPATIENT)
Dept: OPERATING ROOM | Facility: HOSPITAL | Age: 30
End: 2025-05-22
Payer: COMMERCIAL

## 2025-05-22 ENCOUNTER — PHARMACY VISIT (OUTPATIENT)
Dept: PHARMACY | Facility: CLINIC | Age: 30
End: 2025-05-22
Payer: MEDICAID

## 2025-05-22 ENCOUNTER — HOSPITAL ENCOUNTER (OUTPATIENT)
Facility: HOSPITAL | Age: 30
Setting detail: OUTPATIENT SURGERY
Discharge: HOME | End: 2025-05-22
Attending: ORTHOPAEDIC SURGERY | Admitting: ORTHOPAEDIC SURGERY
Payer: COMMERCIAL

## 2025-05-22 ENCOUNTER — ANESTHESIA (OUTPATIENT)
Dept: OPERATING ROOM | Facility: HOSPITAL | Age: 30
End: 2025-05-22
Payer: COMMERCIAL

## 2025-05-22 VITALS
HEART RATE: 90 BPM | RESPIRATION RATE: 19 BRPM | OXYGEN SATURATION: 99 % | SYSTOLIC BLOOD PRESSURE: 117 MMHG | DIASTOLIC BLOOD PRESSURE: 60 MMHG | TEMPERATURE: 97.2 F | HEIGHT: 67 IN | BODY MASS INDEX: 31.66 KG/M2 | WEIGHT: 201.72 LBS

## 2025-05-22 DIAGNOSIS — M25.811 IMPINGEMENT OF RIGHT SHOULDER: Primary | ICD-10-CM

## 2025-05-22 LAB — PREGNANCY TEST URINE, POC: NEGATIVE

## 2025-05-22 PROCEDURE — 3700000002 HC GENERAL ANESTHESIA TIME - EACH INCREMENTAL 1 MINUTE: Performed by: ORTHOPAEDIC SURGERY

## 2025-05-22 PROCEDURE — 2500000005 HC RX 250 GENERAL PHARMACY W/O HCPCS: Mod: JZ | Performed by: ORTHOPAEDIC SURGERY

## 2025-05-22 PROCEDURE — 2500000004 HC RX 250 GENERAL PHARMACY W/ HCPCS (ALT 636 FOR OP/ED): Mod: JZ | Performed by: ANESTHESIOLOGY

## 2025-05-22 PROCEDURE — 7100000010 HC PHASE TWO TIME - EACH INCREMENTAL 1 MINUTE: Performed by: ORTHOPAEDIC SURGERY

## 2025-05-22 PROCEDURE — 3700000001 HC GENERAL ANESTHESIA TIME - INITIAL BASE CHARGE: Performed by: ORTHOPAEDIC SURGERY

## 2025-05-22 PROCEDURE — 2500000004 HC RX 250 GENERAL PHARMACY W/ HCPCS (ALT 636 FOR OP/ED): Mod: JZ

## 2025-05-22 PROCEDURE — 7100000001 HC RECOVERY ROOM TIME - INITIAL BASE CHARGE: Performed by: ORTHOPAEDIC SURGERY

## 2025-05-22 PROCEDURE — 2500000004 HC RX 250 GENERAL PHARMACY W/ HCPCS (ALT 636 FOR OP/ED): Mod: JZ | Performed by: ORTHOPAEDIC SURGERY

## 2025-05-22 PROCEDURE — 29823 SHO ARTHRS SRG XTNSV DBRDMT: CPT | Performed by: ORTHOPAEDIC SURGERY

## 2025-05-22 PROCEDURE — 81025 URINE PREGNANCY TEST: CPT | Performed by: ORTHOPAEDIC SURGERY

## 2025-05-22 PROCEDURE — 2720000007 HC OR 272 NO HCPCS: Performed by: ORTHOPAEDIC SURGERY

## 2025-05-22 PROCEDURE — 2500000004 HC RX 250 GENERAL PHARMACY W/ HCPCS (ALT 636 FOR OP/ED): Mod: JZ | Performed by: NURSE ANESTHETIST, CERTIFIED REGISTERED

## 2025-05-22 PROCEDURE — 3600000002 HC OR TIME - INITIAL BASE CHARGE - PROCEDURE LEVEL TWO: Performed by: ORTHOPAEDIC SURGERY

## 2025-05-22 PROCEDURE — 7100000009 HC PHASE TWO TIME - INITIAL BASE CHARGE: Performed by: ORTHOPAEDIC SURGERY

## 2025-05-22 PROCEDURE — 3600000007 HC OR TIME - EACH INCREMENTAL 1 MINUTE - PROCEDURE LEVEL TWO: Performed by: ORTHOPAEDIC SURGERY

## 2025-05-22 PROCEDURE — 64415 NJX AA&/STRD BRCH PLXS IMG: CPT | Performed by: ANESTHESIOLOGY

## 2025-05-22 PROCEDURE — 7100000002 HC RECOVERY ROOM TIME - EACH INCREMENTAL 1 MINUTE: Performed by: ORTHOPAEDIC SURGERY

## 2025-05-22 PROCEDURE — RXMED WILLOW AMBULATORY MEDICATION CHARGE

## 2025-05-22 RX ORDER — PROPOFOL 10 MG/ML
INJECTION, EMULSION INTRAVENOUS AS NEEDED
Status: DISCONTINUED | OUTPATIENT
Start: 2025-05-22 | End: 2025-05-22

## 2025-05-22 RX ORDER — ONDANSETRON HYDROCHLORIDE 2 MG/ML
INJECTION, SOLUTION INTRAVENOUS AS NEEDED
Status: DISCONTINUED | OUTPATIENT
Start: 2025-05-22 | End: 2025-05-22

## 2025-05-22 RX ORDER — LIDOCAINE HYDROCHLORIDE 10 MG/ML
0.1 INJECTION, SOLUTION INFILTRATION; PERINEURAL ONCE
Status: DISCONTINUED | OUTPATIENT
Start: 2025-05-22 | End: 2025-05-22 | Stop reason: HOSPADM

## 2025-05-22 RX ORDER — HYDROMORPHONE HYDROCHLORIDE 1 MG/ML
1 INJECTION, SOLUTION INTRAMUSCULAR; INTRAVENOUS; SUBCUTANEOUS EVERY 5 MIN PRN
Status: DISCONTINUED | OUTPATIENT
Start: 2025-05-22 | End: 2025-05-22 | Stop reason: HOSPADM

## 2025-05-22 RX ORDER — SODIUM CHLORIDE, SODIUM LACTATE, POTASSIUM CHLORIDE, CALCIUM CHLORIDE 600; 310; 30; 20 MG/100ML; MG/100ML; MG/100ML; MG/100ML
100 INJECTION, SOLUTION INTRAVENOUS CONTINUOUS
Status: DISCONTINUED | OUTPATIENT
Start: 2025-05-22 | End: 2025-05-22 | Stop reason: HOSPADM

## 2025-05-22 RX ORDER — LIDOCAINE HYDROCHLORIDE 20 MG/ML
INJECTION, SOLUTION EPIDURAL; INFILTRATION; INTRACAUDAL; PERINEURAL AS NEEDED
Status: DISCONTINUED | OUTPATIENT
Start: 2025-05-22 | End: 2025-05-22

## 2025-05-22 RX ORDER — MIDAZOLAM HYDROCHLORIDE 1 MG/ML
INJECTION, SOLUTION INTRAMUSCULAR; INTRAVENOUS AS NEEDED
Status: DISCONTINUED | OUTPATIENT
Start: 2025-05-22 | End: 2025-05-22

## 2025-05-22 RX ORDER — ONDANSETRON 4 MG/1
4 TABLET, FILM COATED ORAL EVERY 8 HOURS PRN
Qty: 10 TABLET | Refills: 0 | Status: SHIPPED | OUTPATIENT
Start: 2025-05-22

## 2025-05-22 RX ORDER — OXYCODONE HYDROCHLORIDE 5 MG/1
5 TABLET ORAL EVERY 4 HOURS PRN
Qty: 20 TABLET | Refills: 0 | Status: SHIPPED | OUTPATIENT
Start: 2025-05-22 | End: 2025-05-29

## 2025-05-22 RX ORDER — ACETAMINOPHEN 500 MG
1000 TABLET ORAL EVERY 6 HOURS PRN
Qty: 60 TABLET | Refills: 0 | Status: SHIPPED | OUTPATIENT
Start: 2025-05-22 | End: 2025-06-01

## 2025-05-22 RX ORDER — FENTANYL CITRATE 50 UG/ML
INJECTION, SOLUTION INTRAMUSCULAR; INTRAVENOUS
Status: COMPLETED
Start: 2025-05-22 | End: 2025-05-22

## 2025-05-22 RX ORDER — MIDAZOLAM HYDROCHLORIDE 1 MG/ML
INJECTION, SOLUTION INTRAMUSCULAR; INTRAVENOUS
Status: COMPLETED
Start: 2025-05-22 | End: 2025-05-22

## 2025-05-22 RX ORDER — SODIUM CHLORIDE 0.9 G/100ML
INJECTION, SOLUTION IRRIGATION AS NEEDED
Status: DISCONTINUED | OUTPATIENT
Start: 2025-05-22 | End: 2025-05-22 | Stop reason: HOSPADM

## 2025-05-22 RX ORDER — SODIUM CHLORIDE, SODIUM LACTATE, POTASSIUM CHLORIDE, CALCIUM CHLORIDE 600; 310; 30; 20 MG/100ML; MG/100ML; MG/100ML; MG/100ML
INJECTION, SOLUTION INTRAVENOUS CONTINUOUS PRN
Status: DISCONTINUED | OUTPATIENT
Start: 2025-05-22 | End: 2025-05-22

## 2025-05-22 RX ORDER — CEFAZOLIN SODIUM 2 G/50ML
2 SOLUTION INTRAVENOUS ONCE
Status: COMPLETED | OUTPATIENT
Start: 2025-05-22 | End: 2025-05-22

## 2025-05-22 RX ORDER — FENTANYL CITRATE 50 UG/ML
INJECTION, SOLUTION INTRAMUSCULAR; INTRAVENOUS AS NEEDED
Status: DISCONTINUED | OUTPATIENT
Start: 2025-05-22 | End: 2025-05-22

## 2025-05-22 RX ORDER — ROCURONIUM BROMIDE 10 MG/ML
INJECTION, SOLUTION INTRAVENOUS AS NEEDED
Status: DISCONTINUED | OUTPATIENT
Start: 2025-05-22 | End: 2025-05-22

## 2025-05-22 RX ADMIN — CEFAZOLIN SODIUM 2 G: 2 SOLUTION INTRAVENOUS at 13:39

## 2025-05-22 RX ADMIN — LIDOCAINE HYDROCHLORIDE 100 MG: 20 INJECTION, SOLUTION EPIDURAL; INFILTRATION; INTRACAUDAL; PERINEURAL at 13:30

## 2025-05-22 RX ADMIN — ONDANSETRON 4 MG: 2 INJECTION INTRAMUSCULAR; INTRAVENOUS at 14:10

## 2025-05-22 RX ADMIN — POVIDONE-IODINE 1 APPLICATION: 5 SOLUTION TOPICAL at 11:49

## 2025-05-22 RX ADMIN — MIDAZOLAM 2 MG: 1 INJECTION INTRAMUSCULAR; INTRAVENOUS at 13:06

## 2025-05-22 RX ADMIN — SUGAMMADEX 200 MG: 100 INJECTION, SOLUTION INTRAVENOUS at 14:23

## 2025-05-22 RX ADMIN — FENTANYL CITRATE 100 MCG: 50 INJECTION, SOLUTION INTRAMUSCULAR; INTRAVENOUS at 13:06

## 2025-05-22 RX ADMIN — FENTANYL CITRATE 100 MCG: 50 INJECTION, SOLUTION INTRAMUSCULAR; INTRAVENOUS at 13:30

## 2025-05-22 RX ADMIN — SODIUM CHLORIDE, POTASSIUM CHLORIDE, SODIUM LACTATE AND CALCIUM CHLORIDE: 600; 310; 30; 20 INJECTION, SOLUTION INTRAVENOUS at 13:26

## 2025-05-22 RX ADMIN — PROPOFOL 200 MG: 10 INJECTION, EMULSION INTRAVENOUS at 13:30

## 2025-05-22 RX ADMIN — HYDROMORPHONE HYDROCHLORIDE 0.5 MG: 1 INJECTION, SOLUTION INTRAMUSCULAR; INTRAVENOUS; SUBCUTANEOUS at 14:50

## 2025-05-22 RX ADMIN — ROCURONIUM BROMIDE 50 MG: 10 INJECTION INTRAVENOUS at 13:31

## 2025-05-22 RX ADMIN — PROMETHAZINE HYDROCHLORIDE 6.25 MG: 25 INJECTION INTRAMUSCULAR; INTRAVENOUS at 15:20

## 2025-05-22 RX ADMIN — DEXAMETHASONE SODIUM PHOSPHATE 4 MG: 4 INJECTION, SOLUTION INTRAMUSCULAR; INTRAVENOUS at 13:41

## 2025-05-22 SDOH — HEALTH STABILITY: MENTAL HEALTH: CURRENT SMOKER: 0

## 2025-05-22 ASSESSMENT — COLUMBIA-SUICIDE SEVERITY RATING SCALE - C-SSRS
1. IN THE PAST MONTH, HAVE YOU WISHED YOU WERE DEAD OR WISHED YOU COULD GO TO SLEEP AND NOT WAKE UP?: NO
6. HAVE YOU EVER DONE ANYTHING, STARTED TO DO ANYTHING, OR PREPARED TO DO ANYTHING TO END YOUR LIFE?: NO
2. HAVE YOU ACTUALLY HAD ANY THOUGHTS OF KILLING YOURSELF?: NO

## 2025-05-22 ASSESSMENT — PAIN SCALES - GENERAL
PAINLEVEL_OUTOF10: 9
PAINLEVEL_OUTOF10: 6
PAINLEVEL_OUTOF10: 0 - NO PAIN
PAINLEVEL_OUTOF10: 9
PAIN_LEVEL: 4

## 2025-05-22 ASSESSMENT — PAIN - FUNCTIONAL ASSESSMENT
PAIN_FUNCTIONAL_ASSESSMENT: 0-10

## 2025-05-22 ASSESSMENT — PAIN DESCRIPTION - ORIENTATION: ORIENTATION: RIGHT;POSTERIOR

## 2025-05-22 ASSESSMENT — PAIN DESCRIPTION - LOCATION: LOCATION: ARM

## 2025-05-22 ASSESSMENT — PAIN DESCRIPTION - DESCRIPTORS
DESCRIPTORS: THROBBING
DESCRIPTORS: THROBBING;TENDER

## 2025-05-22 NOTE — ANESTHESIA POSTPROCEDURE EVALUATION
Patient: Liliana Jay    Procedure Summary       Date: 05/22/25 Room / Location: [facility] 07 / [facility]    Anesthesia Start: 1326 Anesthesia Stop: 1441    Procedure: RIGHT SHOULDER ARTHROSCOPY/ EXTENSIVE DEBRIDEMENT (Right: Shoulder) Diagnosis:       Impingement of right shoulder      (Impingement of right shoulder [M25.811])    Surgeons: Camden Ellis MD Responsible Provider: Enrrique Carrillo MD    Anesthesia Type: general ASA Status: 2            Anesthesia Type: general    Vitals Value Taken Time   /104 05/22/25 15:01   Temp 36.2 °C (97.2 °F) 05/22/25 14:38   Pulse 84 05/22/25 15:13   Resp 16 05/22/25 15:15   SpO2 97 % 05/22/25 15:13   Vitals shown include unfiled device data.    Anesthesia Post Evaluation    Patient location during evaluation: PACU  Patient participation: complete - patient participated  Level of consciousness: awake and alert  Pain score: 4  Pain management: adequate  Airway patency: patent  Cardiovascular status: acceptable  Respiratory status: acceptable  Hydration status: acceptable  Postoperative Nausea and Vomiting: none        No notable events documented.

## 2025-05-22 NOTE — ANESTHESIA PROCEDURE NOTES
Peripheral Block    Patient location during procedure: pre-op  Medication administered at: 5/22/2025 1:05 PM  End time: 5/22/2025 1:18 PM  Reason for block: at surgeon's request and post-op pain management  Staffing  Performed: attending   Authorized by: Mike Guzmán MD    Performed by: Mike Guzmán MD  Preanesthetic Checklist  Completed: patient identified, IV checked, site marked, risks and benefits discussed, surgical consent, monitors and equipment checked, pre-op evaluation and timeout performed   Timeout performed at: 5/22/2025 1:05 PM  Peripheral Block  Patient position: laying flat  Prep: ChloraPrep  Patient monitoring: heart rate, cardiac monitor and continuous pulse ox  Block type: interscalene  Laterality: right  Injection technique: single-shot  Guidance: nerve stimulator and ultrasound guided  Local infiltration: ropivacaine  Infiltration strength: 0.5 %  Dose: 30 mL  Needle  Needle type: Tuohy   Needle gauge: 21 G  Needle length: 5 cm  Needle localization: nerve stimulator and ultrasound guidance  Assessment  Injection assessment: negative aspiration for heme, no paresthesia on injection, incremental injection and local visualized surrounding nerve on ultrasound  Heart rate change: no  Slow fractionated injection: yes  Additional Notes  R/B/A to anesthesia discussed with patient at length.  All questions answered.  Patient wishes to proceed with peripheral nerve block with general anesthetic.  Patient premedicated with 2mg Midazolam and 100ucg fentanyl.  ASA monitors placed with 4L nasal canula oxygen.  Time out done with RN and Anesthesia Tech.  Strict aseptic technique/sterile prep and drape.  30cc 0.5% Ropivicaine injected in divided doses with negative aspiration confirmed every 5cc.  Patient tolerated procedure.  No complications, No complaints.  All VSS.  RN and anesthesia tech in room with Patient and MD at all times

## 2025-05-22 NOTE — OP NOTE
RIGHT SHOULDER ARTHROSCOPY/ EXTENSIVE DEBRIDEMENT (R) Operative Note     Date: 2025  OR Location: [facility]    Name: Liliana Jay, : 1995, Age: 30 y.o., MRN: [id number], Sex: female    Diagnosis  Pre-op Diagnosis      * Impingement of right shoulder [M25.811] Post-op Diagnosis     * Impingement of right shoulder [M25.811], rotator cuff tear, labral tear, chondromalacia     Procedures    Right shoulder arthroscopy with extensive debridement    Surgeons      * Camden Ellis - Primary    Resident/Fellow/Other Assistant:    Andrew Anthony    Staff:   Circulator: Shelby Brown Person: Kyra  Surgical Assistant: Andrew Jo Scrub: Antoinetet    Anesthesia Staff: Anesthesiologist: Enrrique Carrillo MD  CRNA: RORO Gabriel-CRNA    Procedure Summary  Anesthesia: General  ASA: II  Estimated Blood Loss: 2 mL    Indications: 30-year-old with right shoulder pain that has not responded to extensive conservative treatment.  Treatment options including no treatment were reviewed and the decision was made to proceed with surgery.    Description of procedure: Patient was brought in the op room after scalene block was performed.  General anesthesia was performed.  She was positioned in the lateral decubitus position and prepped and draped in the usual fashion.  The joint was injected with saline and a posterior arthroscopy portal was established.  Arthroscopic examination of the joint was performed.  There was tearing involving the superior labrum.  Bicep tendon had a normal appearance.  There was a Cem type complex.  There was partial articular sided tearing involving the posterior aspect of the supraspinatus tendon.  Subscapularis tendon was intact.  Anterior inferior and posterior inferior labrum were intact.  No loose bodies in the inferior pouch.  There was grade II chondromalacia along the posterior glenoid near the chondral labral junction.  An anterior portal was established.  The motorized shaver  was introduced.  Motorized shaver was used to perform debridement of the labrum to a stable cartilage edge.  The insertion of the superior labrum was carefully visualized and was felt to be intact.  Motorized shaver was used to perform debridement of the rotator cuff.  This was approximately a 10 to 50% partial articular sided tear involving the posterior aspect of the supraspinatus tendon.  Motorized shaver was used to perform chondroplasty along the glenoid.  Articular cartilage on the humeral head was intact.  The arthroscope was placed in the subacromial space.  There was extensive subacromial bursitis.  Motorized shaver was introduced and using a combination of the motorized shaver and the radiofrequency wand subacromial bursectomy was performed.  Rotator cuff was visualized from the bursal side and was intact.  There was a anterior impinging osteophyte.  The CA ligament was released and using the motorized bur acromioplasty was performed until there was adequate decompression of the subacromial space.  The subacromial space was well irrigated and hemostasis was carefully obtained.  The instruments were removed and portals closed with nylon suture and a sterile dressing and a sling were applied and she tolerated the procedure well and was taken the recovery room in stable condition.    Intra-op Medications:   Administrations occurring from 1315 to 1535 on 05/22/25:   Medication Name Total Dose   sodium chloride 0.9 % irrigation solution 3,600 mL   dexAMETHasone (Decadron) 4 mg/mL IV Syringe 2 mL 4 mg   fentaNYL (Sublimaze) injection 50 mcg/mL 100 mcg   LR infusion Cannot be calculated   lidocaine PF (Xylocaine-MPF) local injection 2 % 100 mg   ondansetron (Zofran) 2 mg/mL injection 4 mg   propofol (Diprivan) injection 10 mg/mL 200 mg   rocuronium (ZeMuron) 50 mg/5 mL injection 50 mg   sugammadex (Bridion) 200 mg/2 mL injection 200 mg   ceFAZolin (Ancef) 2 g in dextrose (iso) IV 50 mL 2 g   fentaNYL PF  (Sublimaze) injection 50 mcg/mL  - Omnicell Override Pull Cannot be calculated   midazolam (Versed) injection 1 mg/mL  - Omnicell Override Pull Cannot be calculated              Anesthesia Record               Intraprocedure I/O Totals       None           Specimen: No specimens collected         Task Performed by RNFA or Surgical Assistant:  The assistant was required due to the complexity of the procedure.  No qualified resident was available.  The assistant was required for positioning of the arm and arthroscope throughout the procedure.        Attending Attestation: I performed the procedure.    Camden Ellis  Phone Number: [phone]

## 2025-05-22 NOTE — ANESTHESIA PREPROCEDURE EVALUATION
Patient: Liliana Jay    Procedure Information       Anesthesia Start Date/Time: 05/22/25 1326    Procedure: RIGHT SHOULDER ARTHROSCOPIC ROTATOR CUFF REPAIR (Right: Shoulder) - Scalene Block    Location: [facility] / [facility]    Surgeons: Camden Ellis MD            Relevant Problems   Anesthesia (within normal limits)      Cardiac   (+) Breast pain in female   (+) Chest pain   (+) Chest pain, atypical   (+) Hypertension   (+) Pain of right breast      Pulmonary   (+) PE (pulmonary thromboembolism) (Multi)   (+) Pulmonary embolism   (+) Pulmonary embolism, bilateral (Multi)   (+) Recurrent pulmonary emboli (Multi)   (+) SOB (shortness of breath) on exertion      Neuro   (+) Anxiety   (+) Anxiety and depression   (+) Bilateral carpal tunnel syndrome   (+) Bipolar affective disorder, remission status unspecified (Multi)   (+) CVA (cerebral vascular accident) (Multi)   (+) Cervical radiculopathy at C6   (+) Chronic lumbar radiculopathy   (+) Depression   (+) Generalized anxiety disorder   (+) Lumbar radiculopathy   (+) Lumbosacral radiculitis   (+) Stroke (Multi)      GI   (+) IBS (irritable bowel syndrome)      Liver   (+) Choledocholithiasis      Hematology   (+) DVT of lower limb, acute   (+) PE (pulmonary thromboembolism) (Multi)   (+) Prothrombin deficiency      Musculoskeletal   (+) Bilateral carpal tunnel syndrome   (+) DJD (degenerative joint disease)      ID   (+) BV (bacterial vaginosis)   (+) COVID-19   (+) Hidradenitis suppurativa       Clinical information reviewed:    Allergies      OB Status           NPO Detail:  NPO/Void Status  Date of Last Liquid: 05/22/25  Time of Last Liquid: 1000  Date of Last Solid: 05/21/25  Time of Last Solid: 2200         Physical Exam    Airway  Mallampati: I  TM distance: >3 FB  Neck ROM: full  Mouth opening: 3 or more finger widths     Cardiovascular - normal exam   Dental - normal exam     Pulmonary - normal exam   Abdominal - normal exam           Anesthesia  Plan    History of general anesthesia?: yes  History of complications of general anesthesia?: no    ASA 2     regional and general     The patient is not a current smoker.  Patient was previously instructed to abstain from smoking on day of procedure.  Patient did not smoke on day of procedure.    intravenous induction   Postoperative pain plan includes opioids.  Trial extubation is planned.  Anesthetic plan and risks discussed with patient.  Use of blood products discussed with patient who consented to blood products.    Plan discussed with CRNA.

## 2025-05-22 NOTE — ANESTHESIA PROCEDURE NOTES
Airway  Date/Time: 5/22/2025 1:34 PM  Reason: elective    Airway not difficult    Staffing  Performed: CRNA   Authorized by: Enrrique Carrillo MD    Performed by: RORO Gabriel-LI  Patient location during procedure: OR    Patient Condition  Indications for airway management: anesthesia  Patient position: sniffing  Planned trial extubation  Sedation level: deep     Final Airway Details   Preoxygenated: yes  Final airway type: endotracheal airway  Successful airway: ETT  Cuffed: yes   Successful intubation technique: direct laryngoscopy  Adjuncts used in placement: intubating stylet  Endotracheal tube insertion site: oral  Blade size: #3  ETT size (mm): 7.0  Cormack-Lehane Classification: grade IIa - partial view of glottis  Placement verified by: chest auscultation and capnometry   Measured from: teeth  ETT to teeth (cm): 21  Number of attempts at approach: 1

## 2025-05-23 ASSESSMENT — PAIN SCALES - GENERAL: PAINLEVEL_OUTOF10: 0 - NO PAIN

## 2025-05-29 ENCOUNTER — OFFICE VISIT (OUTPATIENT)
Dept: ORTHOPEDIC SURGERY | Facility: CLINIC | Age: 30
End: 2025-05-29
Payer: COMMERCIAL

## 2025-05-29 VITALS — BODY MASS INDEX: 31.08 KG/M2 | WEIGHT: 198 LBS | HEIGHT: 67 IN

## 2025-05-29 DIAGNOSIS — M25.811 IMPINGEMENT OF RIGHT SHOULDER: Primary | ICD-10-CM

## 2025-05-29 PROCEDURE — 3008F BODY MASS INDEX DOCD: CPT

## 2025-05-29 PROCEDURE — 99211 OFF/OP EST MAY X REQ PHY/QHP: CPT

## 2025-05-29 NOTE — PROGRESS NOTES
30-year-old female presenting to the office for 1 week follow-up status post right shoulder arthroscopy with subacromial decompression and extensive debridement on 5/22/2025 with Dr. Ellis. She is progressing well, having less pain.     Sutures were removed from x3 portal sites. Steri-strips applied. Healing, no signs of infection.     Precautions were reviewed. She will begin physical therapy, referral placed. She will follow-up with Dr. Ellis in 6 weeks.

## 2025-06-23 ENCOUNTER — DOCUMENTATION (OUTPATIENT)
Dept: PHYSICAL THERAPY | Facility: CLINIC | Age: 30
End: 2025-06-23
Payer: COMMERCIAL

## 2025-06-23 NOTE — PROGRESS NOTES
Physical Therapy    Therapy Communication Note    Patient Name: Liliaan Jay  MRN: [id number]  Today's Date: 6/23/2025     Discipline: Physical Therapy    Missed Visit Reason: No show    Missed Time: No Show    Comment: Patient did not come to scheduled appointment this date.

## 2025-07-11 ENCOUNTER — APPOINTMENT (OUTPATIENT)
Dept: PRIMARY CARE | Facility: CLINIC | Age: 30
End: 2025-07-11
Payer: COMMERCIAL

## 2025-07-30 ENCOUNTER — APPOINTMENT (OUTPATIENT)
Dept: PRIMARY CARE | Facility: CLINIC | Age: 30
End: 2025-07-30
Payer: COMMERCIAL

## 2025-07-30 VITALS
BODY MASS INDEX: 32.02 KG/M2 | WEIGHT: 204 LBS | SYSTOLIC BLOOD PRESSURE: 120 MMHG | DIASTOLIC BLOOD PRESSURE: 82 MMHG | HEIGHT: 67 IN

## 2025-07-30 DIAGNOSIS — G89.29 OTHER CHRONIC PAIN: ICD-10-CM

## 2025-07-30 DIAGNOSIS — M54.50 BILATERAL LOW BACK PAIN WITHOUT SCIATICA, UNSPECIFIED CHRONICITY: ICD-10-CM

## 2025-07-30 DIAGNOSIS — I26.99 PULMONARY EMBOLISM, UNSPECIFIED CHRONICITY, UNSPECIFIED PULMONARY EMBOLISM TYPE, UNSPECIFIED WHETHER ACUTE COR PULMONALE PRESENT (MULTI): ICD-10-CM

## 2025-07-30 PROCEDURE — 3074F SYST BP LT 130 MM HG: CPT | Performed by: STUDENT IN AN ORGANIZED HEALTH CARE EDUCATION/TRAINING PROGRAM

## 2025-07-30 PROCEDURE — 99213 OFFICE O/P EST LOW 20 MIN: CPT | Performed by: STUDENT IN AN ORGANIZED HEALTH CARE EDUCATION/TRAINING PROGRAM

## 2025-07-30 PROCEDURE — 3008F BODY MASS INDEX DOCD: CPT | Performed by: STUDENT IN AN ORGANIZED HEALTH CARE EDUCATION/TRAINING PROGRAM

## 2025-07-30 PROCEDURE — 3079F DIAST BP 80-89 MM HG: CPT | Performed by: STUDENT IN AN ORGANIZED HEALTH CARE EDUCATION/TRAINING PROGRAM

## 2025-07-30 RX ORDER — GABAPENTIN 300 MG/1
300 CAPSULE ORAL 3 TIMES DAILY
Qty: 290 CAPSULE | Refills: 1 | Status: SHIPPED | OUTPATIENT
Start: 2025-07-30

## 2025-07-30 RX ORDER — TIZANIDINE 4 MG/1
TABLET ORAL
Qty: 180 TABLET | Refills: 1 | Status: SHIPPED | OUTPATIENT
Start: 2025-07-30

## 2025-07-30 NOTE — PROGRESS NOTES
"Subjective   Patient ID: Liliana Jay is a 30 y.o. female who presents for Follow-up.    HPI     Presents for follow-up medication refill.  Recently underwent right shoulder arthroscopy with subacromial decompression and extensive debridement with Dr. Ellis.  In the process of scheduling subsequent physical therapy.  Has been following with her mental health provider monthly and herViibryd was recently increased     Review of Systems    8 point review of systems is otherwise negative unless mentioned on HPI      Objective   /82   Ht 1.702 m (5' 7\")   Wt 92.5 kg (204 lb)   LMP 07/15/2019   BMI 31.95 kg/m²     Physical Exam    General: No acute distress  HEENT: EOMI  CV: Regular rate and rhythm, normal S1 and S2, no murmurs  Pulm: Clear to auscultation bilaterally, no wheezings, rales or rhonchi  Abd: Nondistended  MSK: 5/5 strength in all extremities  Skin: No rashes   Lymphatic: No lymphadenopathy      Assessment/Plan       #Anxiety/Depression  -Previously had taken Lexapro, when had restarted did not think it had been effective as before was since transitioned to Zoloft  -Was briefly out of lexapro, since restarting, does not think has been effective as past  -Had been on celexa in the past  -Had been on duloxetine in the past, had suicidal thoughts while on this medication  -Previously was taking lorazepam as needed that was stopped after mental health hospitalization   -Has been started on Abilify 10 mg daily  -Has been starting gabapentin 300 mg 3 times a day  -Vibryd was the most recent addition and has been going through up-titration  -Follows with psychiatry monthly     #History of VTE  #Prothrombin gene disorder  - On lifetime anticoagulation, on Eliquis  - Followed with Hematology at [facility]     #chronic neck pain  #chronic low back pain  - Did not get any relief from gabapentin  - Has been seen by Pain mgmt, not a good candidate for injection d/t AC use  - Has been on zanaflex for the chronic " pain, continue  - Takes Tylenol 4-5 times per week for pain  - Magnesium supplementation     #Seizure  -Follows with [facility] neurology, on keppra      #Gastroparesis  -Had plan to follow-up with gastroparesis through [facility]     #Forearm/hand numbness  -Improvement in forearm numbness with B12 injections, received weekly x4     RTC 6 months     This note was dictated by speech recognition. Minor errors in transcription may be present.

## 2025-08-15 ENCOUNTER — APPOINTMENT (OUTPATIENT)
Dept: OBSTETRICS AND GYNECOLOGY | Facility: CLINIC | Age: 30
End: 2025-08-15
Payer: COMMERCIAL

## 2025-09-04 ENCOUNTER — DOCUMENTATION (OUTPATIENT)
Dept: PRIMARY CARE | Facility: CLINIC | Age: 30
End: 2025-09-04
Payer: COMMERCIAL

## 2025-09-04 ENCOUNTER — PATIENT OUTREACH (OUTPATIENT)
Dept: PRIMARY CARE | Facility: CLINIC | Age: 30
End: 2025-09-04
Payer: COMMERCIAL

## 2025-09-04 RX ORDER — PREDNISONE 5 MG/1
5 TABLET ORAL DAILY
COMMUNITY

## 2025-09-10 ENCOUNTER — APPOINTMENT (OUTPATIENT)
Dept: PRIMARY CARE | Facility: CLINIC | Age: 30
End: 2025-09-10
Payer: COMMERCIAL

## 2026-01-28 ENCOUNTER — APPOINTMENT (OUTPATIENT)
Dept: PRIMARY CARE | Facility: CLINIC | Age: 31
End: 2026-01-28
Payer: COMMERCIAL

## (undated) DEVICE — DRAPE, INCISE, ANTIMICROBIAL, IOBAN 2, STERI DRAPE, 23 X 33 IN, DISPOSABLE, STERILE

## (undated) DEVICE — Device

## (undated) DEVICE — TUBING, CLEAR N-COND, 5MM X 10, LF

## (undated) DEVICE — SLEEVE, VASO PRESS, CALF GARMENT, MEDIUM, GREEN

## (undated) DEVICE — DRAPE, SHEET, U, W/ADHESIVE STRIP, IMPERVIOUS, 60 X 70 IN, DISPOSABLE, LF, STERILE

## (undated) DEVICE — DRESSING, ABDOMINAL, TENDERSORB, 8 X 7-1/2 IN, STERILE

## (undated) DEVICE — DRAPE, TIBURON, SPLIT SHEET, REINF ADH STRIP, 77X108

## (undated) DEVICE — CONNECTOR, 5 IN 1, STERILE

## (undated) DEVICE — BLADE, STRYKER, 4.0MM, RESECTOR, F-SERIES

## (undated) DEVICE — GOWN, SMARTY, XXL, X-LONG

## (undated) DEVICE — BURR, STRYKER, 5.5MM, BRL 6FLT

## (undated) DEVICE — PROTECTOR, HEEL/ANKLE/ELBOW, UNIVERSAL

## (undated) DEVICE — APPLICATOR, CHLORAPREP, W/ORANGE TINT, 26ML

## (undated) DEVICE — DRAPE, U-DRAPE, NON STERILE

## (undated) DEVICE — DRESSING, GAUZE, PETROLATUM, PATCH, XEROFORM, 1 X 8 IN, STERILE

## (undated) DEVICE — PROBE, APOLLO RF, 90 DEG, EXTRA LARTGE

## (undated) DEVICE — TUBING, PATIENT 8FT STERILE

## (undated) DEVICE — SLEEVE, ARM, LAERAL TRACTION

## (undated) DEVICE — DRESSING, GAUZE, SPONGE, 12 PLY, CURITY, 4 X 4 IN, RIGID TRAY, STERILE